# Patient Record
Sex: MALE | Race: BLACK OR AFRICAN AMERICAN | NOT HISPANIC OR LATINO | Employment: FULL TIME | URBAN - METROPOLITAN AREA
[De-identification: names, ages, dates, MRNs, and addresses within clinical notes are randomized per-mention and may not be internally consistent; named-entity substitution may affect disease eponyms.]

---

## 2022-12-09 RX ORDER — HYDROMORPHONE HYDROCHLORIDE 8 MG/1
8 TABLET ORAL EVERY 4 HOURS PRN
Status: ON HOLD | COMMUNITY
End: 2023-01-04 | Stop reason: HOSPADM

## 2022-12-09 RX ORDER — TIZANIDINE 4 MG/1
4 TABLET ORAL EVERY 8 HOURS PRN
COMMUNITY

## 2022-12-09 RX ORDER — AMITRIPTYLINE HYDROCHLORIDE 75 MG/1
75 TABLET ORAL NIGHTLY
COMMUNITY

## 2022-12-09 RX ORDER — GABAPENTIN 600 MG/1
600 TABLET ORAL 3 TIMES DAILY
COMMUNITY

## 2022-12-09 RX ORDER — POLYETHYLENE GLYCOL 3350 17 G/17G
17 POWDER, FOR SOLUTION ORAL DAILY PRN
COMMUNITY

## 2022-12-12 ENCOUNTER — HOSPITAL ENCOUNTER (OUTPATIENT)
Dept: RADIOLOGY | Facility: HOSPITAL | Age: 52
Discharge: HOME | End: 2022-12-12
Attending: COLON & RECTAL SURGERY
Payer: COMMERCIAL

## 2022-12-12 DIAGNOSIS — Z00.00 ROUTINE CHECK-UP: ICD-10-CM

## 2022-12-12 PROCEDURE — 93010 EKG 12-LEAD: ICD-10-PCS | Mod: 76,,, | Performed by: STUDENT IN AN ORGANIZED HEALTH CARE EDUCATION/TRAINING PROGRAM

## 2022-12-12 PROCEDURE — 93005 ELECTROCARDIOGRAM TRACING: CPT

## 2022-12-12 PROCEDURE — 93010 ELECTROCARDIOGRAM REPORT: CPT | Mod: 76,,, | Performed by: STUDENT IN AN ORGANIZED HEALTH CARE EDUCATION/TRAINING PROGRAM

## 2022-12-12 PROCEDURE — 71046 X-RAY EXAM CHEST 2 VIEWS: CPT | Mod: TC

## 2022-12-12 NOTE — PROGRESS NOTES
Preop CHG instructions: 3 preop showers using provided CHG solution starting Saturday (12/24/22.)Do not use solution on face, use a clean towel for each wash and always start at surgery site (abdomen/belly). No animals in bed. No lotions, creams, powders, etc to skin after last wash.  Wash sheets, undergarments and pajamas in HOT water and normal detergent when surgery washes are started Saturday (12/24/22.) Printed Preop CHG scrub instructions and solution given.      12/12/22 1103   Education   Person Taught patient;spouse   Learning Readiness and Ability no barriers identified   Teaching Focus other (see comments)  (preop bathing instructions)   Education Outcome Evaluation eager to learn;able to teach back;verbalizes understanding

## 2022-12-21 ENCOUNTER — ANESTHESIA EVENT (OUTPATIENT)
Dept: SURGERY | Facility: HOSPITAL | Age: 52
DRG: 330 | End: 2022-12-21
Payer: COMMERCIAL

## 2022-12-23 ENCOUNTER — LAB VISIT (OUTPATIENT)
Dept: LAB | Facility: HOSPITAL | Age: 52
End: 2022-12-23
Attending: NURSE PRACTITIONER
Payer: COMMERCIAL

## 2022-12-23 DIAGNOSIS — Z01.818 PREOP TESTING: ICD-10-CM

## 2022-12-23 PROCEDURE — 93010 ELECTROCARDIOGRAM REPORT: CPT | Mod: ,,, | Performed by: INTERNAL MEDICINE

## 2022-12-23 PROCEDURE — 93010 EKG 12-LEAD: ICD-10-PCS | Mod: ,,, | Performed by: INTERNAL MEDICINE

## 2022-12-23 PROCEDURE — 93005 ELECTROCARDIOGRAM TRACING: CPT

## 2022-12-23 NOTE — PROGRESS NOTES
"Spoke with Kamaljit Hernandez NP, Kamaljit had Dr Zahida Ocasio reviewed chart. Pt denies SOB/chest pain, DASI 11/12. "Ok to proceed"   "

## 2022-12-27 ENCOUNTER — ANESTHESIA (OUTPATIENT)
Dept: SURGERY | Facility: HOSPITAL | Age: 52
DRG: 330 | End: 2022-12-27
Payer: COMMERCIAL

## 2022-12-27 ENCOUNTER — HOSPITAL ENCOUNTER (INPATIENT)
Facility: HOSPITAL | Age: 52
LOS: 8 days | Discharge: HOME HEALTH | DRG: 330 | End: 2023-01-04
Attending: COLON & RECTAL SURGERY | Admitting: COLON & RECTAL SURGERY
Payer: COMMERCIAL

## 2022-12-27 DIAGNOSIS — Z01.818 PREOP TESTING: ICD-10-CM

## 2022-12-27 DIAGNOSIS — C20 RECTAL CANCER: ICD-10-CM

## 2022-12-27 DIAGNOSIS — C20 MALIGNANT NEOPLASM OF RECTUM: Primary | ICD-10-CM

## 2022-12-27 LAB
ABORH RETYPE: NORMAL
GROUP & RH: NORMAL
INDIRECT COOMBS GEL: NORMAL

## 2022-12-27 PROCEDURE — 37000008 HC ANESTHESIA 1ST 15 MINUTES: Performed by: COLON & RECTAL SURGERY

## 2022-12-27 PROCEDURE — 63600175 PHARM REV CODE 636 W HCPCS: Performed by: ANESTHESIOLOGY

## 2022-12-27 PROCEDURE — 25000003 PHARM REV CODE 250: Performed by: COLON & RECTAL SURGERY

## 2022-12-27 PROCEDURE — 27000221 HC OXYGEN, UP TO 24 HOURS

## 2022-12-27 PROCEDURE — 37000009 HC ANESTHESIA EA ADD 15 MINS: Performed by: COLON & RECTAL SURGERY

## 2022-12-27 PROCEDURE — 63600175 PHARM REV CODE 636 W HCPCS

## 2022-12-27 PROCEDURE — 63600175 PHARM REV CODE 636 W HCPCS: Performed by: COLON & RECTAL SURGERY

## 2022-12-27 PROCEDURE — 71000033 HC RECOVERY, INTIAL HOUR: Performed by: COLON & RECTAL SURGERY

## 2022-12-27 PROCEDURE — 86900 BLOOD TYPING SEROLOGIC ABO: CPT | Performed by: COLON & RECTAL SURGERY

## 2022-12-27 PROCEDURE — 25000003 PHARM REV CODE 250: Performed by: ANESTHESIOLOGY

## 2022-12-27 PROCEDURE — 86923 COMPATIBILITY TEST ELECTRIC: CPT | Mod: 91 | Performed by: COLON & RECTAL SURGERY

## 2022-12-27 PROCEDURE — C1765 ADHESION BARRIER: HCPCS | Performed by: COLON & RECTAL SURGERY

## 2022-12-27 PROCEDURE — 25000003 PHARM REV CODE 250

## 2022-12-27 PROCEDURE — 36000709 HC OR TIME LEV III EA ADD 15 MIN: Performed by: COLON & RECTAL SURGERY

## 2022-12-27 PROCEDURE — 27201423 OPTIME MED/SURG SUP & DEVICES STERILE SUPPLY: Performed by: COLON & RECTAL SURGERY

## 2022-12-27 PROCEDURE — C1729 CATH, DRAINAGE: HCPCS | Performed by: COLON & RECTAL SURGERY

## 2022-12-27 PROCEDURE — 11000001 HC ACUTE MED/SURG PRIVATE ROOM

## 2022-12-27 PROCEDURE — 36415 COLL VENOUS BLD VENIPUNCTURE: CPT | Performed by: COLON & RECTAL SURGERY

## 2022-12-27 PROCEDURE — 36000708 HC OR TIME LEV III 1ST 15 MIN: Performed by: COLON & RECTAL SURGERY

## 2022-12-27 DEVICE — BARRIER SEPRAFILM ADHESION: Type: IMPLANTABLE DEVICE | Site: ABDOMEN | Status: FUNCTIONAL

## 2022-12-27 RX ORDER — METHOCARBAMOL 100 MG/ML
1000 INJECTION, SOLUTION INTRAMUSCULAR; INTRAVENOUS ONCE
Status: COMPLETED | OUTPATIENT
Start: 2022-12-27 | End: 2022-12-27

## 2022-12-27 RX ORDER — HYDROMORPHONE HYDROCHLORIDE 2 MG/ML
1 INJECTION, SOLUTION INTRAMUSCULAR; INTRAVENOUS; SUBCUTANEOUS EVERY 4 HOURS PRN
Status: DISCONTINUED | OUTPATIENT
Start: 2022-12-27 | End: 2022-12-30

## 2022-12-27 RX ORDER — HYDROMORPHONE HYDROCHLORIDE 2 MG/ML
0.2 INJECTION, SOLUTION INTRAMUSCULAR; INTRAVENOUS; SUBCUTANEOUS EVERY 5 MIN PRN
Status: DISCONTINUED | OUTPATIENT
Start: 2022-12-27 | End: 2022-12-27

## 2022-12-27 RX ORDER — HYDROCODONE BITARTRATE AND ACETAMINOPHEN 500; 5 MG/1; MG/1
TABLET ORAL
Status: DISCONTINUED | OUTPATIENT
Start: 2022-12-27 | End: 2022-12-27

## 2022-12-27 RX ORDER — LIDOCAINE HYDROCHLORIDE 10 MG/ML
1 INJECTION, SOLUTION EPIDURAL; INFILTRATION; INTRACAUDAL; PERINEURAL ONCE
Status: CANCELLED | OUTPATIENT
Start: 2022-12-27 | End: 2022-12-27

## 2022-12-27 RX ORDER — HYDROMORPHONE HYDROCHLORIDE 2 MG/ML
0.4 INJECTION, SOLUTION INTRAMUSCULAR; INTRAVENOUS; SUBCUTANEOUS EVERY 5 MIN PRN
Status: DISCONTINUED | OUTPATIENT
Start: 2022-12-27 | End: 2022-12-27

## 2022-12-27 RX ORDER — METHOCARBAMOL 100 MG/ML
INJECTION, SOLUTION INTRAMUSCULAR; INTRAVENOUS
Status: COMPLETED
Start: 2022-12-27 | End: 2022-12-27

## 2022-12-27 RX ORDER — MUPIROCIN 20 MG/G
OINTMENT TOPICAL 2 TIMES DAILY
Status: DISPENSED | OUTPATIENT
Start: 2022-12-27 | End: 2023-01-01

## 2022-12-27 RX ORDER — GABAPENTIN 300 MG/1
600 CAPSULE ORAL ONCE
Status: COMPLETED | OUTPATIENT
Start: 2022-12-27 | End: 2022-12-27

## 2022-12-27 RX ORDER — MIDAZOLAM HYDROCHLORIDE 1 MG/ML
2 INJECTION INTRAMUSCULAR; INTRAVENOUS ONCE AS NEEDED
Status: CANCELLED | OUTPATIENT
Start: 2022-12-27 | End: 2034-05-25

## 2022-12-27 RX ORDER — ONDANSETRON 2 MG/ML
4 INJECTION INTRAMUSCULAR; INTRAVENOUS EVERY 8 HOURS PRN
Status: DISCONTINUED | OUTPATIENT
Start: 2022-12-27 | End: 2023-01-04 | Stop reason: HOSPADM

## 2022-12-27 RX ORDER — ROCURONIUM BROMIDE 10 MG/ML
INJECTION, SOLUTION INTRAVENOUS
Status: DISCONTINUED | OUTPATIENT
Start: 2022-12-27 | End: 2022-12-27

## 2022-12-27 RX ORDER — ENOXAPARIN SODIUM 100 MG/ML
30 INJECTION SUBCUTANEOUS ONCE
Status: COMPLETED | OUTPATIENT
Start: 2022-12-27 | End: 2022-12-27

## 2022-12-27 RX ORDER — PROCHLORPERAZINE EDISYLATE 5 MG/ML
5 INJECTION INTRAMUSCULAR; INTRAVENOUS EVERY 30 MIN PRN
Status: DISCONTINUED | OUTPATIENT
Start: 2022-12-27 | End: 2022-12-27

## 2022-12-27 RX ORDER — ACETAMINOPHEN 10 MG/ML
1000 INJECTION, SOLUTION INTRAVENOUS ONCE
Status: COMPLETED | OUTPATIENT
Start: 2022-12-27 | End: 2022-12-27

## 2022-12-27 RX ORDER — ONDANSETRON 4 MG/1
8 TABLET, ORALLY DISINTEGRATING ORAL EVERY 6 HOURS PRN
Status: CANCELLED | OUTPATIENT
Start: 2022-12-27

## 2022-12-27 RX ORDER — GABAPENTIN 300 MG/1
600 CAPSULE ORAL 3 TIMES DAILY
Status: DISCONTINUED | OUTPATIENT
Start: 2022-12-27 | End: 2022-12-31

## 2022-12-27 RX ORDER — KETOROLAC TROMETHAMINE 30 MG/ML
15 INJECTION, SOLUTION INTRAMUSCULAR; INTRAVENOUS EVERY 6 HOURS
Status: DISCONTINUED | OUTPATIENT
Start: 2022-12-27 | End: 2022-12-28

## 2022-12-27 RX ORDER — IPRATROPIUM BROMIDE AND ALBUTEROL SULFATE 2.5; .5 MG/3ML; MG/3ML
3 SOLUTION RESPIRATORY (INHALATION)
Status: DISCONTINUED | OUTPATIENT
Start: 2022-12-27 | End: 2022-12-27

## 2022-12-27 RX ORDER — PROPOFOL 10 MG/ML
VIAL (ML) INTRAVENOUS
Status: DISCONTINUED | OUTPATIENT
Start: 2022-12-27 | End: 2022-12-27

## 2022-12-27 RX ORDER — CELECOXIB 200 MG/1
200 CAPSULE ORAL ONCE
Status: COMPLETED | OUTPATIENT
Start: 2022-12-27 | End: 2022-12-27

## 2022-12-27 RX ORDER — PHENYLEPHRINE HCL IN 0.9% NACL 1 MG/10 ML
SYRINGE (ML) INTRAVENOUS
Status: DISCONTINUED | OUTPATIENT
Start: 2022-12-27 | End: 2022-12-27

## 2022-12-27 RX ORDER — SODIUM CHLORIDE, SODIUM LACTATE, POTASSIUM CHLORIDE, CALCIUM CHLORIDE 600; 310; 30; 20 MG/100ML; MG/100ML; MG/100ML; MG/100ML
INJECTION, SOLUTION INTRAVENOUS CONTINUOUS
Status: DISCONTINUED | OUTPATIENT
Start: 2022-12-27 | End: 2022-12-31

## 2022-12-27 RX ORDER — MEPERIDINE HYDROCHLORIDE 25 MG/ML
12.5 INJECTION INTRAMUSCULAR; INTRAVENOUS; SUBCUTANEOUS EVERY 10 MIN PRN
Status: DISCONTINUED | OUTPATIENT
Start: 2022-12-27 | End: 2022-12-27

## 2022-12-27 RX ORDER — PROCHLORPERAZINE EDISYLATE 5 MG/ML
5 INJECTION INTRAMUSCULAR; INTRAVENOUS EVERY 6 HOURS PRN
Status: DISCONTINUED | OUTPATIENT
Start: 2022-12-27 | End: 2023-01-04 | Stop reason: HOSPADM

## 2022-12-27 RX ORDER — LIDOCAINE HYDROCHLORIDE 20 MG/ML
INJECTION, SOLUTION EPIDURAL; INFILTRATION; INTRACAUDAL; PERINEURAL
Status: DISCONTINUED | OUTPATIENT
Start: 2022-12-27 | End: 2022-12-27

## 2022-12-27 RX ORDER — FENTANYL CITRATE 50 UG/ML
INJECTION, SOLUTION INTRAMUSCULAR; INTRAVENOUS
Status: DISCONTINUED | OUTPATIENT
Start: 2022-12-27 | End: 2022-12-27

## 2022-12-27 RX ORDER — ONDANSETRON HYDROCHLORIDE 2 MG/ML
INJECTION, SOLUTION INTRAMUSCULAR; INTRAVENOUS
Status: DISCONTINUED | OUTPATIENT
Start: 2022-12-27 | End: 2022-12-27

## 2022-12-27 RX ORDER — DEXAMETHASONE SODIUM PHOSPHATE 4 MG/ML
INJECTION, SOLUTION INTRA-ARTICULAR; INTRALESIONAL; INTRAMUSCULAR; INTRAVENOUS; SOFT TISSUE
Status: DISCONTINUED | OUTPATIENT
Start: 2022-12-27 | End: 2022-12-27

## 2022-12-27 RX ORDER — ONDANSETRON 2 MG/ML
4 INJECTION INTRAMUSCULAR; INTRAVENOUS DAILY PRN
Status: DISCONTINUED | OUTPATIENT
Start: 2022-12-27 | End: 2022-12-27

## 2022-12-27 RX ORDER — ACETAMINOPHEN 650 MG/20.3ML
650 LIQUID ORAL EVERY 4 HOURS
Status: DISPENSED | OUTPATIENT
Start: 2022-12-27 | End: 2022-12-30

## 2022-12-27 RX ORDER — SODIUM CHLORIDE, SODIUM GLUCONATE, SODIUM ACETATE, POTASSIUM CHLORIDE AND MAGNESIUM CHLORIDE 30; 37; 368; 526; 502 MG/100ML; MG/100ML; MG/100ML; MG/100ML; MG/100ML
INJECTION, SOLUTION INTRAVENOUS CONTINUOUS
Status: CANCELLED | OUTPATIENT
Start: 2022-12-27 | End: 2023-01-26

## 2022-12-27 RX ORDER — BUPIVACAINE HYDROCHLORIDE 5 MG/ML
INJECTION, SOLUTION EPIDURAL; INTRACAUDAL
Status: DISCONTINUED | OUTPATIENT
Start: 2022-12-27 | End: 2022-12-27 | Stop reason: HOSPADM

## 2022-12-27 RX ORDER — MIDAZOLAM HYDROCHLORIDE 1 MG/ML
INJECTION INTRAMUSCULAR; INTRAVENOUS
Status: DISCONTINUED | OUTPATIENT
Start: 2022-12-27 | End: 2022-12-27

## 2022-12-27 RX ORDER — TIZANIDINE 4 MG/1
4 TABLET ORAL EVERY 8 HOURS PRN
Status: DISCONTINUED | OUTPATIENT
Start: 2022-12-27 | End: 2023-01-04 | Stop reason: HOSPADM

## 2022-12-27 RX ORDER — HYDROMORPHONE HYDROCHLORIDE 2 MG/ML
INJECTION, SOLUTION INTRAMUSCULAR; INTRAVENOUS; SUBCUTANEOUS
Status: DISCONTINUED | OUTPATIENT
Start: 2022-12-27 | End: 2022-12-27

## 2022-12-27 RX ADMIN — HYDROMORPHONE HYDROCHLORIDE 0.5 MG: 2 INJECTION, SOLUTION INTRAMUSCULAR; INTRAVENOUS; SUBCUTANEOUS at 12:12

## 2022-12-27 RX ADMIN — Medication 100 MCG: at 11:12

## 2022-12-27 RX ADMIN — ROCURONIUM BROMIDE 20 MG: 50 INJECTION INTRAVENOUS at 10:12

## 2022-12-27 RX ADMIN — ROCURONIUM BROMIDE 20 MG: 50 INJECTION INTRAVENOUS at 11:12

## 2022-12-27 RX ADMIN — MUPIROCIN: 20 OINTMENT TOPICAL at 11:12

## 2022-12-27 RX ADMIN — GABAPENTIN 600 MG: 300 CAPSULE ORAL at 08:12

## 2022-12-27 RX ADMIN — ERTAPENEM 1 G: 1 INJECTION INTRAMUSCULAR; INTRAVENOUS at 08:12

## 2022-12-27 RX ADMIN — AMITRIPTYLINE HYDROCHLORIDE 75 MG: 25 TABLET, FILM COATED ORAL at 08:12

## 2022-12-27 RX ADMIN — SUGAMMADEX 200 MG: 100 INJECTION, SOLUTION INTRAVENOUS at 12:12

## 2022-12-27 RX ADMIN — LIDOCAINE HYDROCHLORIDE 80 MG: 20 INJECTION, SOLUTION EPIDURAL; INFILTRATION; INTRACAUDAL; PERINEURAL at 08:12

## 2022-12-27 RX ADMIN — ENOXAPARIN SODIUM 30 MG: 30 INJECTION SUBCUTANEOUS at 08:12

## 2022-12-27 RX ADMIN — HYDROMORPHONE HYDROCHLORIDE 0.5 MG: 2 INJECTION, SOLUTION INTRAMUSCULAR; INTRAVENOUS; SUBCUTANEOUS at 10:12

## 2022-12-27 RX ADMIN — ROCURONIUM BROMIDE 20 MG: 50 INJECTION INTRAVENOUS at 09:12

## 2022-12-27 RX ADMIN — DEXAMETHASONE SODIUM PHOSPHATE 4 MG: 4 INJECTION, SOLUTION INTRA-ARTICULAR; INTRALESIONAL; INTRAMUSCULAR; INTRAVENOUS; SOFT TISSUE at 08:12

## 2022-12-27 RX ADMIN — HYDROMORPHONE HYDROCHLORIDE 1 MG: 2 INJECTION INTRAMUSCULAR; INTRAVENOUS; SUBCUTANEOUS at 02:12

## 2022-12-27 RX ADMIN — SODIUM CHLORIDE, POTASSIUM CHLORIDE, SODIUM LACTATE AND CALCIUM CHLORIDE: 600; 310; 30; 20 INJECTION, SOLUTION INTRAVENOUS at 03:12

## 2022-12-27 RX ADMIN — HYDROMORPHONE HYDROCHLORIDE 0.2 MG: 2 INJECTION, SOLUTION INTRAMUSCULAR; INTRAVENOUS; SUBCUTANEOUS at 09:12

## 2022-12-27 RX ADMIN — METHOCARBAMOL 1000 MG: 100 INJECTION, SOLUTION INTRAMUSCULAR; INTRAVENOUS at 01:12

## 2022-12-27 RX ADMIN — ACETAMINOPHEN 1000 MG: 10 INJECTION, SOLUTION INTRAVENOUS at 08:12

## 2022-12-27 RX ADMIN — PROPOFOL 150 MG: 10 INJECTION, EMULSION INTRAVENOUS at 08:12

## 2022-12-27 RX ADMIN — FENTANYL CITRATE 100 MCG: 50 INJECTION, SOLUTION INTRAMUSCULAR; INTRAVENOUS at 08:12

## 2022-12-27 RX ADMIN — ONDANSETRON 4 MG: 2 INJECTION INTRAMUSCULAR; INTRAVENOUS at 12:12

## 2022-12-27 RX ADMIN — MIDAZOLAM HYDROCHLORIDE 2 MG: 1 INJECTION, SOLUTION INTRAMUSCULAR; INTRAVENOUS at 08:12

## 2022-12-27 RX ADMIN — KETOROLAC TROMETHAMINE 15 MG: 30 INJECTION, SOLUTION INTRAMUSCULAR; INTRAVENOUS at 05:12

## 2022-12-27 RX ADMIN — KETOROLAC TROMETHAMINE 15 MG: 30 INJECTION, SOLUTION INTRAMUSCULAR; INTRAVENOUS at 11:12

## 2022-12-27 RX ADMIN — HYDROMORPHONE HYDROCHLORIDE 1 MG: 2 INJECTION INTRAMUSCULAR; INTRAVENOUS; SUBCUTANEOUS at 08:12

## 2022-12-27 RX ADMIN — CELECOXIB 200 MG: 200 CAPSULE ORAL at 08:12

## 2022-12-27 RX ADMIN — ROCURONIUM BROMIDE 50 MG: 50 INJECTION INTRAVENOUS at 08:12

## 2022-12-27 RX ADMIN — Medication 100 MCG: at 12:12

## 2022-12-27 RX ADMIN — HYDROMORPHONE HYDROCHLORIDE 0.3 MG: 2 INJECTION, SOLUTION INTRAMUSCULAR; INTRAVENOUS; SUBCUTANEOUS at 09:12

## 2022-12-27 RX ADMIN — SODIUM CHLORIDE, SODIUM GLUCONATE, SODIUM ACETATE, POTASSIUM CHLORIDE AND MAGNESIUM CHLORIDE: 526; 502; 368; 37; 30 INJECTION, SOLUTION INTRAVENOUS at 08:12

## 2022-12-27 RX ADMIN — ACETAMINOPHEN 650 MG: 650 SOLUTION ORAL at 05:12

## 2022-12-27 NOTE — TRANSFER OF CARE
"Anesthesia Transfer of Care Note    Patient: Demetrio Wilson Jr.    Procedure(s) Performed: Procedure(s) (LRB):  PROCTECTOMY, ABDOMINOPERINEAL (N/A)    Patient location: PACU    Anesthesia Type: general    Transport from OR: Transported from OR on room air with adequate spontaneous ventilation    Post pain: adequate analgesia    Post assessment: no apparent anesthetic complications    Post vital signs: stable    Level of consciousness: sedated    Nausea/Vomiting: no nausea/vomiting    Complications: none    Transfer of care protocol was followed      Last vitals:   Visit Vitals  /87   Pulse 92   Temp 37.6 °C (99.6 °F) (Oral)   Resp 18   Ht 5' 8" (1.727 m)   Wt 87.1 kg (192 lb 0.3 oz)   SpO2 96%   BMI 29.20 kg/m²     "

## 2022-12-27 NOTE — ANESTHESIA POSTPROCEDURE EVALUATION
Anesthesia Post Evaluation    Patient: Demetrio Wilson     Procedure(s) Performed: Procedure(s) (LRB):  PROCTECTOMY, ABDOMINOPERINEAL (N/A)    Final Anesthesia Type: general      Patient location during evaluation: PACU  Patient participation: Yes- Able to Participate  Level of consciousness: awake  Post-procedure vital signs: reviewed and stable  Pain management: adequate  Airway patency: patent    PONV status at discharge: vomiting (controlled) and nausea (controlled)  Anesthetic complications: no      Cardiovascular status: hemodynamically stable  Respiratory status: spontaneous ventilation and unassisted  Hydration status: euvolemic  Follow-up not needed.  Comments:              Vitals Value Taken Time   /89 12/27/22 1417   Temp ** 12/27/22 1441   Pulse 98 12/27/22 1417   Resp 15 12/27/22 1410   SpO2 98 % 12/27/22 1417         Event Time   Out of Recovery 14:13:00         Pain/Nicolasa Score: Pain Rating Prior to Med Admin: 0 (12/27/2022  8:11 AM)  Nicolasa Score: 8 (12/27/2022  2:13 PM)

## 2022-12-27 NOTE — ANESTHESIA PROCEDURE NOTES
Intubation    Date/Time: 12/27/2022 8:42 AM  Performed by: Jim Bautista  Authorized by: Noe Ramirez Jr., MD     Intubation:     Induction:  Intravenous    Intubated:  Postinduction    Mask Ventilation:  Easy with oral airway    Attempts:  1    Attempted By:  CRNA    Method of Intubation:  Direct    Blade:  Villasenor 2    Laryngeal View Grade: Grade IIA - cords partially seen      Difficult Airway Encountered?: No      Complications:  None    Airway Device:  Oral endotracheal tube    Airway Device Size:  7.5    Style/Cuff Inflation:  Cuffed (inflated to minimal occlusive pressure)    Inflation Amount (mL):  6    Tube secured:  21    Secured at:  The lips    Placement Verified By:  Capnometry    Complicating Factors:  None    Findings Post-Intubation:  BS equal bilateral

## 2022-12-27 NOTE — INTERVAL H&P NOTE
The patient has been examined and the H&P has been reviewed:    I concur with the findings and no changes have occurred since H&P was written.    Surgery risks, benefits and alternative options discussed and understood by patient/family.          Active Hospital Problems    Diagnosis  POA    *Malignant neoplasm of rectum [C20]  Yes      Resolved Hospital Problems   No resolved problems to display.

## 2022-12-27 NOTE — OP NOTE
[facility] - Periop Services  Operative Note      Date of Procedure: 12/27/2022     Procedure: Abdominoperineal resection    Surgeon(s) and Role:     * Bob Rahman MD - Primary    Assisting Surgeon: None    Pre-Operative Diagnosis: Rectal cancer [C20]    Post-Operative Diagnosis: Same    Anesthesia: General    Estimated Blood Loss (EBL): 300 mL           Specimens:   Sigmoid, rectum and anus   Rectal cancer site    Description of Technical Procedures:  After informed consent was obtained, patient was brought to the operating room and placed in the supine position.  Next general endotracheal anesthesia was administered by member of the anesthesia team.  Patient was placed in lithotomy position using Nitin stirrups.  The abdomen and perineum were prepped and draped in sterile surgical fashion.  A low midline incision was made with a 10 blade.  Incision was deepened electrocautery.  The fascia was divided vertically in the midline and the peritoneum was incised sharply.  A large Wilbert wound edge protector was utilized.  Bookwalter was used for exposure.  Sigmoid colon was mobilized by incising the lateral peritoneal attachment with electrocautery working down into the pelvis.  Left ureter was identified and kept out of harm's way.  The right side of the sigmoid was mobilized by incising the peritoneal attachment down into the pelvis again.  The DANDRE vascular pedicle was skeletonized and isolated.  It was sharply divided with Metzenbaum scissors between Serena clamps and 2-0 Vicryl stick ties.  The colon was transected with a TLC 75 mm stapler with a blue cartridge.  Mesentery was divided with the EnSeal X1 device.  The bowel was then packed into the upper abdomen.  The presacral space was entered and this plane was developed deep into the pelvis.  Lateral stalks were divided with electrocautery.  Finally the anterior peritoneal reflection was incised and this plane was developed distally.  Patient  had a very narrow and deep pelvis.  Exposure and visualization became somewhat difficult once we got behind the prostate.  Attention was then turned to the perineum where a shield type incision was made with the electrocautery encompassing the anus and sphincter.  The incision was deepened into the ischiorectal spaces bilaterally.  The dissection was then carried out posteriorly and the pelvis was entered just anterior to the coccyx.  The levators were divided bilaterally then finally the anterior dissection was performed.  The rectal cancer was in the right anterior lateral area and the ulcer was necrotic as it  during retraction.  The previous tumor site appeared heavily scarred to the prostate from the prior radiation.  This plane was then developed and the tumor site was removed.  It was sent as a separate specimen from the sigmoid, rectum, and anus.  The pelvis was then irrigated with saline and hemostasis was achieved with spot cautery.  The perineal incision was closed in a layered fashion using interrupted figure-of-eight 0 Vicryl suture to reapproximate the levators, interrupted 2-0 Vicryl suture to reapproximate the subcutaneous tissue, and interrupted vertical mattress 2-0 Vicryl suture to reapproximate the skin edges.  I then changed gown and gloves and returned to the operating room table.  Pelvis was irrigated and suctioned.  Hemostasis was ensured.  Two 19 Polish Michael drains were placed in the pelvis and brought out through separate stab incisions in the right lower quadrant abdominal wall.  They were secured to the skin with 2-0 silk suture.  A circular skin incision was then made over the left lower quadrant abdominal wall the previously marked spot by the enterostomal therapist.  Incision was deepened electrocautery and a vertical incision was made in the anterior rectus sheath.  Muscle-splitting technique was employed and the posterior fascia and peritoneum were incised to permit 2  fingers.  Colon easily delivered through this wound.  Proper orientation of the colon and mesentery was confirmed.  Seprafilm was laid throughout the abdominal cavity.  The peritoneum was reapproximated with running 0 Vicryl suture.  The midline fascial defect was repaired with running looped PDS suture.  Subcutaneous tissue was irrigated and hemostasis achieved with spot cautery.  Skin edges were reapproximated with skin staples.  Incision was isolated with a blue towel.  The staple line was incised off of the colon and an end colostomy was matured using interrupted full-thickness 2-0 Vicryl suture.  The mucosa was pink and the stoma was widely patent.  A colostomy appliance was placed around the stoma.  Drains were cut to the appropriate length and attached to bulb suction.  A sterile dressing was applied to the midline incision.  Patient tolerated the procedure well and there were no complications.  He was returned to the supine position.  He was awakened and extubated in the operating room then subsequently transferred to recovery in satisfactory condition.

## 2022-12-27 NOTE — ANESTHESIA PREPROCEDURE EVALUATION
12/26/2022  Demetrio Wilson Jr. is a 52 y.o., male with rectal cancer admitted through outpatient surgery for abdominal peritoneal resection of rectal tumor.    Last 3 sets of Vitals    Vitals - 1 value per visit 12/9/2022   Weight (lb) 195   Weight (kg) 88.451   Height 68   BMI (Calculated) 29.7         Lab Results   Component Value Date    WBC 2.7 (L) 12/12/2022    HGB 12.0 (L) 12/12/2022    HCT 38.9 (L) 12/12/2022    MCV 90.0 12/12/2022     12/12/2022          BMP  Lab Results   Component Value Date     12/12/2022    K 3.9 12/12/2022    CO2 28 12/12/2022    BUN 5.7 (L) 12/12/2022    CREATININE 0.81 12/12/2022    CALCIUM 9.6 12/12/2022        CMP  Sodium Level   Date Value Ref Range Status   12/12/2022 140 136 - 145 mmol/L Final     Potassium Level   Date Value Ref Range Status   12/12/2022 3.9 3.5 - 5.1 mmol/L Final     Carbon Dioxide   Date Value Ref Range Status   12/12/2022 28 22 - 29 mmol/L Final     Blood Urea Nitrogen   Date Value Ref Range Status   12/12/2022 5.7 (L) 8.4 - 25.7 mg/dL Final     Creatinine   Date Value Ref Range Status   12/12/2022 0.81 0.73 - 1.18 mg/dL Final     Calcium Level Total   Date Value Ref Range Status   12/12/2022 9.6 8.4 - 10.2 mg/dL Final     Albumin Level   Date Value Ref Range Status   12/12/2022 3.5 3.5 - 5.0 gm/dL Final     Bilirubin Total   Date Value Ref Range Status   12/12/2022 0.4 <=1.5 mg/dL Final     Alkaline Phosphatase   Date Value Ref Range Status   12/12/2022 56 40 - 150 unit/L Final     Aspartate Aminotransferase   Date Value Ref Range Status   12/12/2022 17 5 - 34 unit/L Final     Alanine Aminotransferase   Date Value Ref Range Status   12/12/2022 12 0 - 55 unit/L Final        Pre-op Assessment    I have reviewed the Patient Summary Reports.    I have reviewed the NPO Status.   I have reviewed the Medications.     Review of  Systems  Anesthesia Hx:   Denies Personal Hx of Anesthesia complications.   Social:  Non-Smoker    Cardiovascular:  Functional Capacity good / => 4 METS    Hepatic/GI:   GERD        Physical Exam  General: Well nourished, Cooperative, Alert and Oriented    Airway:  Mouth Opening: Normal  TM Distance: Normal  Tongue: Normal  Neck ROM: Normal ROM    Dental:  Intact    Chest/Lungs:  Clear to auscultation, Normal Respiratory Rate    Heart:  Rate: Normal  Rhythm: Regular Rhythm        Anesthesia Plan  Type of Anesthesia, risks & benefits discussed:    Anesthesia Type: Gen ETT  Intra-op Monitoring Plan: Standard ASA Monitors  Post Op Pain Control Plan: multimodal analgesia and IV/PO Opioids PRN  Induction:  IV  Airway Plan: Direct  Informed Consent: Informed consent signed with the Patient and all parties understand the risks and agree with anesthesia plan.  All questions answered.   ASA Score: 2  Day of Surgery Review of History & Physical: H&P Update referred to the surgeon/provider.    Ready For Surgery From Anesthesia Perspective.     .

## 2022-12-27 NOTE — NURSING
Subjective:       Patient ID: Demetrio Wilson Jr. is a 52 y.o. male.    Chief Complaint: No chief complaint on file.    HPI  Review of Systems    Objective:      Physical Exam    Assessment:       1. Preop testing    2. Rectal cancer           Incision/Site 12/27/22 1155 Abdomen (Active)   12/27/22 1155   Present Prior to Hospital Arrival?:    Side:    Location: Abdomen   Orientation:    Incision Type:    Closure Method:    Additional Comments:    Removal Indication and Assessment:    Wound Outcome:    Removal Indications:    Incision WDL WDL 12/27/22 1310   Dressing Appearance Intact;Dry;Clean 12/27/22 1310   Drainage Amount None 12/27/22 1310   Appearance Dressing in place, unable to visualize 12/27/22 1310   Wound Edges Approximated 12/27/22 1310   Dressing Island/border 12/27/22 1310            Incision/Site 12/27/22 1155 Perineum (Active)   12/27/22 1155   Present Prior to Hospital Arrival?:    Side:    Location: Perineum   Orientation:    Incision Type:    Closure Method:    Additional Comments:    Removal Indication and Assessment:    Wound Outcome:    Removal Indications:    Dressing Appearance Dry;Intact 12/27/22 1310   Dressing Gauze;Other (comment) 12/27/22 1310           Plan:              51 y/o male rectal cancer for sx today per Dr. Rahman.  Orders to [facility] for possible colostomy or ileostomy.   Pt came with his wife.  He is awake alert oriented and mobil.    I educated him on why I was here to [facility]-to find a suitable exit site for the procedure if need .    With him both lying down and standing 2 areas were marked and covered with tegaderm.    Will check on him tomorrow.

## 2022-12-28 LAB
ANION GAP SERPL CALC-SCNC: 8 MEQ/L
BASOPHILS # BLD AUTO: 0.01 X10(3)/MCL (ref 0–0.2)
BASOPHILS NFR BLD AUTO: 0.1 %
BUN SERPL-MCNC: 9.6 MG/DL (ref 8.4–25.7)
CALCIUM SERPL-MCNC: 9.1 MG/DL (ref 8.4–10.2)
CHLORIDE SERPL-SCNC: 104 MMOL/L (ref 98–107)
CO2 SERPL-SCNC: 24 MMOL/L (ref 22–29)
CREAT SERPL-MCNC: 0.79 MG/DL (ref 0.73–1.18)
CREAT/UREA NIT SERPL: 12
EOSINOPHIL # BLD AUTO: 0 X10(3)/MCL (ref 0–0.9)
EOSINOPHIL NFR BLD AUTO: 0 %
ERYTHROCYTE [DISTWIDTH] IN BLOOD BY AUTOMATED COUNT: 14.3 % (ref 11.6–14.4)
GFR SERPLBLD CREATININE-BSD FMLA CKD-EPI: >60 MLS/MIN/1.73/M2
GLUCOSE SERPL-MCNC: 122 MG/DL (ref 74–100)
HCT VFR BLD AUTO: 30.2 % (ref 42–52)
HGB BLD-MCNC: 10 GM/DL (ref 14–18)
IMM GRANULOCYTES # BLD AUTO: 0.04 X10(3)/MCL (ref 0–0.04)
IMM GRANULOCYTES NFR BLD AUTO: 0.5 %
LYMPHOCYTES # BLD AUTO: 0.59 X10(3)/MCL (ref 0.6–4.6)
LYMPHOCYTES NFR BLD AUTO: 7.6 %
MCH RBC QN AUTO: 27.9 PG
MCHC RBC AUTO-ENTMCNC: 33.1 MG/DL (ref 33–36)
MCV RBC AUTO: 84.1 FL (ref 80–94)
MONOCYTES # BLD AUTO: 0.79 X10(3)/MCL (ref 0.1–1.3)
MONOCYTES NFR BLD AUTO: 10.2 %
NEUTROPHILS # BLD AUTO: 6.33 X10(3)/MCL (ref 2.1–9.2)
NEUTROPHILS NFR BLD AUTO: 81.6 %
NRBC BLD AUTO-RTO: 0 % (ref 0–1)
PLATELET # BLD AUTO: 247 X10(3)/MCL (ref 140–371)
PMV BLD AUTO: 8.8 FL (ref 9.4–12.4)
POTASSIUM SERPL-SCNC: 4.2 MMOL/L (ref 3.5–5.1)
RBC # BLD AUTO: 3.59 X10(6)/MCL (ref 4.7–6.1)
SODIUM SERPL-SCNC: 136 MMOL/L (ref 136–145)
WBC # SPEC AUTO: 7.8 X10(3)/MCL (ref 4.5–11.5)

## 2022-12-28 PROCEDURE — 85025 COMPLETE CBC W/AUTO DIFF WBC: CPT | Performed by: COLON & RECTAL SURGERY

## 2022-12-28 PROCEDURE — 25000003 PHARM REV CODE 250: Performed by: COLON & RECTAL SURGERY

## 2022-12-28 PROCEDURE — 99900035 HC TECH TIME PER 15 MIN (STAT)

## 2022-12-28 PROCEDURE — 63600175 PHARM REV CODE 636 W HCPCS: Performed by: COLON & RECTAL SURGERY

## 2022-12-28 PROCEDURE — 80048 BASIC METABOLIC PNL TOTAL CA: CPT | Performed by: COLON & RECTAL SURGERY

## 2022-12-28 PROCEDURE — 11000001 HC ACUTE MED/SURG PRIVATE ROOM

## 2022-12-28 PROCEDURE — 36415 COLL VENOUS BLD VENIPUNCTURE: CPT | Performed by: COLON & RECTAL SURGERY

## 2022-12-28 RX ORDER — POLYETHYLENE GLYCOL 3350 17 G/17G
17 POWDER, FOR SOLUTION ORAL DAILY
Status: DISCONTINUED | OUTPATIENT
Start: 2022-12-28 | End: 2023-01-04 | Stop reason: HOSPADM

## 2022-12-28 RX ORDER — KETOROLAC TROMETHAMINE 30 MG/ML
30 INJECTION, SOLUTION INTRAMUSCULAR; INTRAVENOUS EVERY 6 HOURS
Status: COMPLETED | OUTPATIENT
Start: 2022-12-28 | End: 2022-12-30

## 2022-12-28 RX ORDER — ENOXAPARIN SODIUM 100 MG/ML
40 INJECTION SUBCUTANEOUS EVERY 24 HOURS
Status: DISCONTINUED | OUTPATIENT
Start: 2022-12-28 | End: 2023-01-04 | Stop reason: HOSPADM

## 2022-12-28 RX ADMIN — AMITRIPTYLINE HYDROCHLORIDE 75 MG: 25 TABLET, FILM COATED ORAL at 09:12

## 2022-12-28 RX ADMIN — GABAPENTIN 600 MG: 300 CAPSULE ORAL at 09:12

## 2022-12-28 RX ADMIN — ACETAMINOPHEN 650 MG: 650 SOLUTION ORAL at 09:12

## 2022-12-28 RX ADMIN — SODIUM CHLORIDE, POTASSIUM CHLORIDE, SODIUM LACTATE AND CALCIUM CHLORIDE: 600; 310; 30; 20 INJECTION, SOLUTION INTRAVENOUS at 05:12

## 2022-12-28 RX ADMIN — HYDROMORPHONE HYDROCHLORIDE 1 MG: 2 INJECTION INTRAMUSCULAR; INTRAVENOUS; SUBCUTANEOUS at 05:12

## 2022-12-28 RX ADMIN — POLYETHYLENE GLYCOL 3350 17 G: 17 POWDER, FOR SOLUTION ORAL at 09:12

## 2022-12-28 RX ADMIN — ONDANSETRON 4 MG: 2 INJECTION INTRAMUSCULAR; INTRAVENOUS at 01:12

## 2022-12-28 RX ADMIN — SODIUM CHLORIDE, POTASSIUM CHLORIDE, SODIUM LACTATE AND CALCIUM CHLORIDE: 600; 310; 30; 20 INJECTION, SOLUTION INTRAVENOUS at 09:12

## 2022-12-28 RX ADMIN — MUPIROCIN: 20 OINTMENT TOPICAL at 09:12

## 2022-12-28 RX ADMIN — KETOROLAC TROMETHAMINE 15 MG: 30 INJECTION, SOLUTION INTRAMUSCULAR; INTRAVENOUS at 05:12

## 2022-12-28 RX ADMIN — KETOROLAC TROMETHAMINE 30 MG: 30 INJECTION, SOLUTION INTRAMUSCULAR; INTRAVENOUS at 05:12

## 2022-12-28 RX ADMIN — KETOROLAC TROMETHAMINE 30 MG: 30 INJECTION, SOLUTION INTRAMUSCULAR; INTRAVENOUS at 11:12

## 2022-12-28 RX ADMIN — GABAPENTIN 600 MG: 300 CAPSULE ORAL at 02:12

## 2022-12-28 RX ADMIN — SODIUM CHLORIDE, POTASSIUM CHLORIDE, SODIUM LACTATE AND CALCIUM CHLORIDE: 600; 310; 30; 20 INJECTION, SOLUTION INTRAVENOUS at 03:12

## 2022-12-28 RX ADMIN — ENOXAPARIN SODIUM 40 MG: 40 INJECTION SUBCUTANEOUS at 05:12

## 2022-12-28 RX ADMIN — HYDROMORPHONE HYDROCHLORIDE 1 MG: 2 INJECTION INTRAMUSCULAR; INTRAVENOUS; SUBCUTANEOUS at 02:12

## 2022-12-28 RX ADMIN — TIZANIDINE 4 MG: 4 TABLET ORAL at 09:12

## 2022-12-28 RX ADMIN — HYDROMORPHONE HYDROCHLORIDE 1 MG: 2 INJECTION INTRAMUSCULAR; INTRAVENOUS; SUBCUTANEOUS at 09:12

## 2022-12-28 RX ADMIN — HYDROMORPHONE HYDROCHLORIDE 1 MG: 2 INJECTION INTRAMUSCULAR; INTRAVENOUS; SUBCUTANEOUS at 01:12

## 2022-12-28 NOTE — PROGRESS NOTES
CRS    POD 1 s/p APR    Having some pain control issues  Denies N/V    Afebrile   VSS    Excellent UOP    Abdomen soft, appropriate  Colostomy pink  Drains working    WBC 7.8    Hgb 10.0    Creat 0.7      Plan - Hurst d/t deep pelvic dissection          - continue clear liquids          - continue drains          - consult ET for education          - OOB/ambulate          - Lovenox

## 2022-12-28 NOTE — PLAN OF CARE
Problem: Adult Inpatient Plan of Care  Goal: Plan of Care Review  12/28/2022 0341 by Carla Rico RN  Outcome: Ongoing, Progressing  12/28/2022 0341 by Carla Rico RN  Outcome: Ongoing, Progressing  Goal: Patient-Specific Goal (Individualized)  Outcome: Ongoing, Progressing  Goal: Absence of Hospital-Acquired Illness or Injury  Outcome: Ongoing, Progressing  Goal: Optimal Comfort and Wellbeing  Outcome: Ongoing, Progressing  Goal: Readiness for Transition of Care  Outcome: Ongoing, Progressing     Problem: Infection  Goal: Absence of Infection Signs and Symptoms  Outcome: Ongoing, Progressing     Problem: Pain Acute  Goal: Acceptable Pain Control and Functional Ability  Outcome: Ongoing, Progressing      14.4   7.32  )-----------( 172      ( 30 Aug 2018 15:30 )             43.6     08-30    141  |  103  |  20  ----------------------------<  118<H>  4.1   |  25  |  1.2    Ca    9.2      30 Aug 2018 15:30    TPro  6.5  /  Alb  4.3  /  TBili  0.6  /  DBili  x   /  AST  18  /  ALT  37  /  AlkPhos  56  08-30      Serum Pro-Brain Natriuretic Peptide: 699 pg/mL (08.30.18 @ 15:30)    PT/INR - ( 30 Aug 2018 15:30 )   PT: 12.10 sec;   INR: 1.12 ratio     PTT - ( 30 Aug 2018 15:30 )  PTT:31.5 sec      Blood Gas Profile - Venous (08.30.18 @ 15:42)    pH, Venous: 7.38    pCO2, Venous: 48 mmHg    pO2, Venous: 35 mmHg    HCO3, Venous: 28 mmoL/L    Base Excess, Venous: 2.2 mmoL/L    Oxygen Saturation, Venous: 66 %    FIO2, Venous: 21      CARDIAC MARKERS ( 30 Aug 2018 15:30 )  x     / <0.01 ng/mL / x     / x     / x        X-ray Chest 2 Views PA/Lat (08.30.18 @ 15:47)     Support devices: None.    Cardiac/mediastinum/hilum: Unremarkable.    Lung parenchyma/Pleura: Right perihilar/suprahilar opacity. No pleural   effusion or air leak    Skeleton/soft tissues: Unremarkable.    Impression:      Right perihilar/suprahilar opacity. No pleural effusion or air leak  A call back request was created

## 2022-12-28 NOTE — NURSING
Nurses Note -- 4 Eyes      12/27/2022   6:31 PM      Skin assessed during: Admit      [x] No Pressure Injuries Present    []Prevention Measures Documented        Wound Care Consulted? No    Attending Nurse:  Ray Fuentes RN     Second RN/Staff Member: Milana Nagy RN

## 2022-12-28 NOTE — PLAN OF CARE
Discharge planning discussed with patient and wife Radha [phone]. FOC obtained for [facility]. Referral sent via care port.

## 2022-12-28 NOTE — PROGRESS NOTES
Subjective:       Patient ID: Demetrio Wilson Jr. is a 52 y.o. male.    Chief Complaint: No chief complaint on file.    HPI  Review of Systems    Objective:      Physical Exam    Assessment:       1. Preop testing    2. Rectal cancer           Incision/Site 12/27/22 1155 Abdomen (Active)   12/27/22 1155   Present Prior to Hospital Arrival?:    Side:    Location: Abdomen   Orientation:    Incision Type:    Closure Method:    Additional Comments:    Removal Indication and Assessment:    Wound Outcome:    Removal Indications:    Incision WDL WDL 12/28/22 0927   Dressing Appearance Dry;Intact;Clean 12/28/22 0927   Drainage Amount None 12/28/22 0927   Appearance Dressing in place, unable to visualize 12/28/22 0927   Wound Edges Approximated 12/28/22 0927   Dressing Island/border 12/28/22 0927            Incision/Site 12/27/22 1155 Perineum (Active)   12/27/22 1155   Present Prior to Hospital Arrival?:    Side:    Location: Perineum   Orientation:    Incision Type:    Closure Method:    Additional Comments:    Removal Indication and Assessment:    Wound Outcome:    Removal Indications:    Incision WDL WDL 12/28/22 0927   Dressing Appearance Dry;Intact;Clean 12/28/22 0927   Dressing Gauze 12/28/22 0927           Plan:       51 y/o rectal cancer  post op day 1 new End colostomy.  Very sleepy but awakes to voice and answers but goes back to sleep.    Wife here with him.   Instructed on what a 2 piece system is and how his own looks.  Wife hesitant but verbalizes understanding of his needs.   His own system is intact but without stool or flatus but lynn red.     Video education left with spouse for her and him to watch.   Will return tomorrow for further instruction.

## 2022-12-28 NOTE — PLAN OF CARE
12/28/22 1145   Discharge Assessment   Assessment Type Discharge Planning Assessment   Source of Information patient   Reason For Admission rectal cancer   People in Home spouse   Do you expect to return to your current living situation? Yes   Do you have help at home or someone to help you manage your care at home? Yes   Who are your caregiver(s) and their phone number(s)? isaiah Garcia, [phone]   Prior to hospitilization cognitive status: Alert/Oriented   Current cognitive status: Alert/Oriented   Equipment Currently Used at Home none   Patient currently being followed by outpatient case management? No   Do you currently have service(s) that help you manage your care at home? No   Who is going to help you get home at discharge? Radha   How do you get to doctors appointments? car, drives self   Are you on dialysis? No   Do you take coumadin? No   Discharge Plan A Home Health;Home with family   Discharge Plan B Home Health;Home with family   DME Needed Upon Discharge  colostomy/ostomy supplies   Discharge Plan discussed with: Patient;Spouse/sig other   Name(s) and Number(s) isaiah Garcia, [phone]   Discharge Barriers Identified None     Pt states he lives with wife in single level home. Wife at bedside. She answered most of questions. Pt in pain at time of assessment. Wife states pt independent with ADL's prior to admission, able to drive. Pt does not have DME and not active with home health agency. Pt does not have a current PCP but wife states she is looking into getting patient a PCP. Will follow for discharge needs.

## 2022-12-29 PROCEDURE — 25000003 PHARM REV CODE 250: Performed by: COLON & RECTAL SURGERY

## 2022-12-29 PROCEDURE — 63600175 PHARM REV CODE 636 W HCPCS: Performed by: COLON & RECTAL SURGERY

## 2022-12-29 PROCEDURE — 11000001 HC ACUTE MED/SURG PRIVATE ROOM

## 2022-12-29 RX ADMIN — KETOROLAC TROMETHAMINE 30 MG: 30 INJECTION, SOLUTION INTRAMUSCULAR; INTRAVENOUS at 05:12

## 2022-12-29 RX ADMIN — GABAPENTIN 600 MG: 300 CAPSULE ORAL at 02:12

## 2022-12-29 RX ADMIN — MUPIROCIN: 20 OINTMENT TOPICAL at 09:12

## 2022-12-29 RX ADMIN — HYDROMORPHONE HYDROCHLORIDE 1 MG: 2 INJECTION INTRAMUSCULAR; INTRAVENOUS; SUBCUTANEOUS at 09:12

## 2022-12-29 RX ADMIN — POLYETHYLENE GLYCOL 3350 17 G: 17 POWDER, FOR SOLUTION ORAL at 09:12

## 2022-12-29 RX ADMIN — ACETAMINOPHEN 650 MG: 650 SOLUTION ORAL at 09:12

## 2022-12-29 RX ADMIN — GABAPENTIN 600 MG: 300 CAPSULE ORAL at 09:12

## 2022-12-29 RX ADMIN — KETOROLAC TROMETHAMINE 30 MG: 30 INJECTION, SOLUTION INTRAMUSCULAR; INTRAVENOUS at 12:12

## 2022-12-29 RX ADMIN — AMITRIPTYLINE HYDROCHLORIDE 75 MG: 25 TABLET, FILM COATED ORAL at 09:12

## 2022-12-29 RX ADMIN — KETOROLAC TROMETHAMINE 30 MG: 30 INJECTION, SOLUTION INTRAMUSCULAR; INTRAVENOUS at 11:12

## 2022-12-29 RX ADMIN — TIZANIDINE 4 MG: 4 TABLET ORAL at 09:12

## 2022-12-29 RX ADMIN — ACETAMINOPHEN 650 MG: 650 SOLUTION ORAL at 05:12

## 2022-12-29 RX ADMIN — ENOXAPARIN SODIUM 40 MG: 40 INJECTION SUBCUTANEOUS at 05:12

## 2022-12-29 NOTE — NURSING
12/29/22 0900        Colostomy 12/27/22 1208 LUQ   Placement Date/Time: 12/27/22 1208   Present Prior to Hospital Arrival?: No  Inserted by: MD  Location: LUQ   Stomal Appliance 2 piece;Intact;No Leakage   Stoma Appearance rosebud appearance;moist;red   Stoma Function flatus;no stool   Treatment   (Instructions)   Output (mL) 0 mL   Safety Management   Patient Rounds bed in low position;bed wheels locked;call light in patient/parent reach;clutter free environment maintained;ID band on;placement of personal items at bedside;toileting offered;visualized patient   Positioning   Body Position position changed independently     Post op day 2 new End colostomy.     Pt remains  awake alert oriented today.  He and his wife had viewed most of the video instructions provided.   Today I practiced with his wife on how to change out his 2 piece system with a model.   She would not look at his stoma so she may have problems.  He remains weak but he understands what he needs done just doesn't have the dexterity at present.   He has not been up yet-will note to nurse.  He sat on edge of bed yesterday with help.   Today hopefully walk the hallway.   He has flatus but no stool.     Tomorrow will change out his 2 piece system with both of them.

## 2022-12-29 NOTE — PROGRESS NOTES
CRS    POD 2 s/p APR    Sitting in the chair  Pain better controlled  Tolerating clears    Afebrile   VSS    Excellent UOP    Abdomen soft, appropriate  Colostomy pink with gas in bag  Drains slowing down      Plan - full liquids          - continue drains          - Hurst d/t pelvic surgery          - ET education          - Lovenox

## 2022-12-30 LAB
ALBUMIN SERPL-MCNC: 2.8 G/DL (ref 3.5–5)
ALBUMIN/GLOB SERPL: 1 RATIO (ref 1.1–2)
ALP SERPL-CCNC: 44 UNIT/L (ref 40–150)
ALT SERPL-CCNC: 8 UNIT/L (ref 0–55)
AST SERPL-CCNC: 16 UNIT/L (ref 5–34)
BASOPHILS # BLD AUTO: 0.01 X10(3)/MCL (ref 0–0.2)
BASOPHILS NFR BLD AUTO: 0.2 %
BILIRUBIN DIRECT+TOT PNL SERPL-MCNC: 0.5 MG/DL
BUN SERPL-MCNC: 12.9 MG/DL (ref 8.4–25.7)
CALCIUM SERPL-MCNC: 8.7 MG/DL (ref 8.4–10.2)
CHLORIDE SERPL-SCNC: 105 MMOL/L (ref 98–107)
CO2 SERPL-SCNC: 26 MMOL/L (ref 22–29)
CREAT SERPL-MCNC: 0.78 MG/DL (ref 0.73–1.18)
EOSINOPHIL # BLD AUTO: 0.1 X10(3)/MCL (ref 0–0.9)
EOSINOPHIL NFR BLD AUTO: 1.7 %
ERYTHROCYTE [DISTWIDTH] IN BLOOD BY AUTOMATED COUNT: 14 % (ref 11.6–14.4)
GFR SERPLBLD CREATININE-BSD FMLA CKD-EPI: >60 MLS/MIN/1.73/M2
GLOBULIN SER-MCNC: 2.9 GM/DL (ref 2.4–3.5)
GLUCOSE SERPL-MCNC: 87 MG/DL (ref 74–100)
HCT VFR BLD AUTO: 25.7 % (ref 42–52)
HGB BLD-MCNC: 8.1 GM/DL (ref 14–18)
IMM GRANULOCYTES # BLD AUTO: 0.08 X10(3)/MCL (ref 0–0.04)
IMM GRANULOCYTES NFR BLD AUTO: 1.4 %
LYMPHOCYTES # BLD AUTO: 0.7 X10(3)/MCL (ref 0.6–4.6)
LYMPHOCYTES NFR BLD AUTO: 11.9 %
MCH RBC QN AUTO: 26.9 PG
MCHC RBC AUTO-ENTMCNC: 31.5 MG/DL (ref 33–36)
MCV RBC AUTO: 85.4 FL (ref 80–94)
MONOCYTES # BLD AUTO: 0.65 X10(3)/MCL (ref 0.1–1.3)
MONOCYTES NFR BLD AUTO: 11.1 %
NEUTROPHILS # BLD AUTO: 4.32 X10(3)/MCL (ref 2.1–9.2)
NEUTROPHILS NFR BLD AUTO: 73.7 %
NRBC BLD AUTO-RTO: 0 % (ref 0–1)
PLATELET # BLD AUTO: 268 X10(3)/MCL (ref 140–371)
PMV BLD AUTO: 9 FL (ref 9.4–12.4)
POTASSIUM SERPL-SCNC: 3.8 MMOL/L (ref 3.5–5.1)
PROT SERPL-MCNC: 5.7 GM/DL (ref 6.4–8.3)
RBC # BLD AUTO: 3.01 X10(6)/MCL (ref 4.7–6.1)
SODIUM SERPL-SCNC: 138 MMOL/L (ref 136–145)
WBC # SPEC AUTO: 5.9 X10(3)/MCL (ref 4.5–11.5)

## 2022-12-30 PROCEDURE — 85025 COMPLETE CBC W/AUTO DIFF WBC: CPT | Performed by: COLON & RECTAL SURGERY

## 2022-12-30 PROCEDURE — 11000001 HC ACUTE MED/SURG PRIVATE ROOM

## 2022-12-30 PROCEDURE — 80053 COMPREHEN METABOLIC PANEL: CPT | Performed by: COLON & RECTAL SURGERY

## 2022-12-30 PROCEDURE — 25000003 PHARM REV CODE 250: Performed by: COLON & RECTAL SURGERY

## 2022-12-30 PROCEDURE — 36415 COLL VENOUS BLD VENIPUNCTURE: CPT | Performed by: COLON & RECTAL SURGERY

## 2022-12-30 PROCEDURE — 63600175 PHARM REV CODE 636 W HCPCS: Performed by: COLON & RECTAL SURGERY

## 2022-12-30 RX ORDER — CLONIDINE HYDROCHLORIDE 0.1 MG/1
0.1 TABLET ORAL 2 TIMES DAILY PRN
Status: DISCONTINUED | OUTPATIENT
Start: 2022-12-30 | End: 2023-01-04 | Stop reason: HOSPADM

## 2022-12-30 RX ORDER — HYDROMORPHONE HYDROCHLORIDE 2 MG/1
8 TABLET ORAL EVERY 4 HOURS PRN
Status: DISCONTINUED | OUTPATIENT
Start: 2022-12-30 | End: 2023-01-02

## 2022-12-30 RX ADMIN — MUPIROCIN: 20 OINTMENT TOPICAL at 08:12

## 2022-12-30 RX ADMIN — GABAPENTIN 600 MG: 300 CAPSULE ORAL at 08:12

## 2022-12-30 RX ADMIN — AMITRIPTYLINE HYDROCHLORIDE 75 MG: 25 TABLET, FILM COATED ORAL at 08:12

## 2022-12-30 RX ADMIN — TIZANIDINE 4 MG: 4 TABLET ORAL at 08:12

## 2022-12-30 RX ADMIN — MUPIROCIN: 20 OINTMENT TOPICAL at 10:12

## 2022-12-30 RX ADMIN — CLONIDINE HYDROCHLORIDE 0.1 MG: 0.1 TABLET ORAL at 06:12

## 2022-12-30 RX ADMIN — TIZANIDINE 4 MG: 4 TABLET ORAL at 05:12

## 2022-12-30 RX ADMIN — ACETAMINOPHEN 650 MG: 650 SOLUTION ORAL at 10:12

## 2022-12-30 RX ADMIN — KETOROLAC TROMETHAMINE 30 MG: 30 INJECTION, SOLUTION INTRAMUSCULAR; INTRAVENOUS at 12:12

## 2022-12-30 RX ADMIN — KETOROLAC TROMETHAMINE 30 MG: 30 INJECTION, SOLUTION INTRAMUSCULAR; INTRAVENOUS at 05:12

## 2022-12-30 RX ADMIN — ACETAMINOPHEN 650 MG: 650 SOLUTION ORAL at 02:12

## 2022-12-30 RX ADMIN — POLYETHYLENE GLYCOL 3350 17 G: 17 POWDER, FOR SOLUTION ORAL at 10:12

## 2022-12-30 RX ADMIN — ENOXAPARIN SODIUM 40 MG: 40 INJECTION SUBCUTANEOUS at 05:12

## 2022-12-30 RX ADMIN — GABAPENTIN 600 MG: 300 CAPSULE ORAL at 10:12

## 2022-12-30 RX ADMIN — GABAPENTIN 600 MG: 300 CAPSULE ORAL at 05:12

## 2022-12-30 NOTE — PROGRESS NOTES
"Inpatient Nutrition Evaluation    Admit Date: 12/27/2022   Total duration of encounter: 3 days    Nutrition Recommendation/Prescription     -Advance diet as tolerated per MD. Goal Diet: Low Residue Diet.   -Monitor wt, labs, and intake.     Nutrition Assessment     Chart Review    Reason Seen: continuous nutrition monitoring    Diagnosis:  Rectal cancer s/p Abdominoperineal resection    Relevant Medical History:   Constipation      GERD (gastroesophageal reflux disease)      Insomnia      Leg pain  bilateral    Rectal cancer      Rectal pain      Swelling of lower leg          Nutrition-Related Medications: LR    Nutrition-Related Labs:  12/28: Glu 122, GFR>60    Diet Order: Diet full liquid  Oral Supplement Order: none  Appetite/Oral Intake: good/% of meals  Factors Affecting Nutritional Intake: none identified  Food/Taoist/Cultural Preferences: none reported  Food Allergies: no known food allergies    Skin Integrity: drain/device(s), incision  Wound(s):   surgical incision     Comments    12/30: Pt reports good appetite/intake and usual good appetite prior to admit; denies n/v.     Anthropometrics    Height: 5' 8" (172.7 cm) Height Method: Stated  Last Weight: 87.1 kg (192 lb 0.3 oz) (12/27/22 0808) Weight Method: Standard Scale  BMI (Calculated): 29.2  BMI Classification: overweight (BMI 25-29.9)        Ideal Body Weight (IBW), Male: 154 lb     % Ideal Body Weight, Male (lb): 124.69 %                          Usual Weight Provided By: EMR weight history    Wt Readings from Last 3 Encounters:   12/27/22 0808 87.1 kg (192 lb 0.3 oz)   12/09/22 1329 88.5 kg (195 lb)      Weight Change(s) Since Admission:  Admit Weight: 88.5 kg (195 lb) (12/09/22 1329)      Patient Education    Not applicable.    Monitoring & Evaluation     Dietitian will monitor energy intake and weight change.  Nutrition Risk/Follow-Up: low (follow-up in 5-7 days)  Patients assigned 'low nutrition risk' status do not qualify for a full " nutritional assessment but will be monitored and re-evaluated in a 5-7 day time period. Please consult if re-evaluation needed sooner.

## 2022-12-30 NOTE — NURSING
"   12/30/22 1130        Colostomy 12/27/22 1208 LUQ   Placement Date/Time: 12/27/22 1208   Present Prior to Hospital Arrival?: No  Inserted by: MD  Location: LUQ   Stomal Appliance 2 piece;Intact;No Leakage   Stoma Appearance round;rosebud appearance;moist;swollen;red   Stoma Size (in) 1 5/8"  x 1 5/8"   Site Assessment Dry;Intact   Peristomal Assessment Dry;Intact   Stoma Function flatus;no stool   Treatment   (changed out his system with him)     Had planned on doing instructions this  8am with pt and his wife.   He is still very  week and unsteady.  She did not come.  I checked each each and at 11;30  I assisted him to sit on edge of bed and demonstrated how to change out his 2 piece system.   He verbalizes understanding but remains unsteady and hands are weak  with scissors and the 2 piece system.  Will return next week to continue.   Care concerns with nurse Cortés.    Enrolled in secure start program.     "

## 2022-12-30 NOTE — PROGRESS NOTES
"CRS    POD 3 s/p APR    Pain improving  Tolerating full liquids  Reports gas from colostomy  "I got up by myself yesterday"    Afebrile   VSS    Great UOP    Abdomen soft, incision c/d/I  Colostomy edematous but pink  Drains clearing up    WBC 5.9     Hgb 8.1    Creat 0.7      Plan - fulls          - Hurst          - drains          - mobilize          - ET education          - Lovenox  "

## 2022-12-31 LAB
ABO + RH BLD: NORMAL
ABO + RH BLD: NORMAL
ALBUMIN SERPL-MCNC: 3.1 G/DL (ref 3.5–5)
ALBUMIN/GLOB SERPL: 0.8 RATIO (ref 1.1–2)
ALP SERPL-CCNC: 53 UNIT/L (ref 40–150)
ALT SERPL-CCNC: 9 UNIT/L (ref 0–55)
AST SERPL-CCNC: 13 UNIT/L (ref 5–34)
BASOPHILS # BLD AUTO: 0.02 X10(3)/MCL (ref 0–0.2)
BASOPHILS NFR BLD AUTO: 0.3 %
BILIRUBIN DIRECT+TOT PNL SERPL-MCNC: 0.6 MG/DL
BLD PROD TYP BPU: NORMAL
BLD PROD TYP BPU: NORMAL
BLOOD UNIT EXPIRATION DATE: NORMAL
BLOOD UNIT EXPIRATION DATE: NORMAL
BLOOD UNIT TYPE CODE: 5100
BLOOD UNIT TYPE CODE: 5100
BUN SERPL-MCNC: 12.5 MG/DL (ref 8.4–25.7)
CALCIUM SERPL-MCNC: 9 MG/DL (ref 8.4–10.2)
CHLORIDE SERPL-SCNC: 102 MMOL/L (ref 98–107)
CO2 SERPL-SCNC: 22 MMOL/L (ref 22–29)
CREAT SERPL-MCNC: 0.87 MG/DL (ref 0.73–1.18)
CROSSMATCH INTERPRETATION: NORMAL
CROSSMATCH INTERPRETATION: NORMAL
DISPENSE STATUS: NORMAL
DISPENSE STATUS: NORMAL
EOSINOPHIL # BLD AUTO: 0 X10(3)/MCL (ref 0–0.9)
EOSINOPHIL NFR BLD AUTO: 0 %
ERYTHROCYTE [DISTWIDTH] IN BLOOD BY AUTOMATED COUNT: 13.8 % (ref 11.6–14.4)
GFR SERPLBLD CREATININE-BSD FMLA CKD-EPI: >60 MLS/MIN/1.73/M2
GLOBULIN SER-MCNC: 4.1 GM/DL (ref 2.4–3.5)
GLUCOSE SERPL-MCNC: 104 MG/DL (ref 74–100)
HCT VFR BLD AUTO: 28.7 % (ref 42–52)
HGB BLD-MCNC: 9.2 GM/DL (ref 14–18)
IMM GRANULOCYTES # BLD AUTO: 0.08 X10(3)/MCL (ref 0–0.04)
IMM GRANULOCYTES NFR BLD AUTO: 1.2 %
LACTATE SERPL-SCNC: 1.3 MMOL/L (ref 0.5–2.2)
LYMPHOCYTES # BLD AUTO: 0.39 X10(3)/MCL (ref 0.6–4.6)
LYMPHOCYTES NFR BLD AUTO: 5.8 %
MCH RBC QN AUTO: 26.9 PG
MCHC RBC AUTO-ENTMCNC: 32.1 MG/DL (ref 33–36)
MCV RBC AUTO: 83.9 FL (ref 80–94)
MONOCYTES # BLD AUTO: 0.38 X10(3)/MCL (ref 0.1–1.3)
MONOCYTES NFR BLD AUTO: 5.7 %
NEUTROPHILS # BLD AUTO: 5.8 X10(3)/MCL (ref 2.1–9.2)
NEUTROPHILS NFR BLD AUTO: 87 %
NRBC BLD AUTO-RTO: 0 % (ref 0–1)
PLATELET # BLD AUTO: 347 X10(3)/MCL (ref 140–371)
PMV BLD AUTO: 8.7 FL (ref 9.4–12.4)
POTASSIUM SERPL-SCNC: 3.8 MMOL/L (ref 3.5–5.1)
PROT SERPL-MCNC: 7.2 GM/DL (ref 6.4–8.3)
RBC # BLD AUTO: 3.42 X10(6)/MCL (ref 4.7–6.1)
SODIUM SERPL-SCNC: 135 MMOL/L (ref 136–145)
UNIT NUMBER: NORMAL
UNIT NUMBER: NORMAL
WBC # SPEC AUTO: 6.7 X10(3)/MCL (ref 4.5–11.5)

## 2022-12-31 PROCEDURE — 25000003 PHARM REV CODE 250: Performed by: SURGERY

## 2022-12-31 PROCEDURE — 80053 COMPREHEN METABOLIC PANEL: CPT | Performed by: SURGERY

## 2022-12-31 PROCEDURE — 25000003 PHARM REV CODE 250: Performed by: COLON & RECTAL SURGERY

## 2022-12-31 PROCEDURE — 36415 COLL VENOUS BLD VENIPUNCTURE: CPT | Performed by: SURGERY

## 2022-12-31 PROCEDURE — 83605 ASSAY OF LACTIC ACID: CPT | Performed by: SURGERY

## 2022-12-31 PROCEDURE — 63600175 PHARM REV CODE 636 W HCPCS: Performed by: COLON & RECTAL SURGERY

## 2022-12-31 PROCEDURE — 85025 COMPLETE CBC W/AUTO DIFF WBC: CPT | Performed by: SURGERY

## 2022-12-31 PROCEDURE — 63600175 PHARM REV CODE 636 W HCPCS: Performed by: SURGERY

## 2022-12-31 PROCEDURE — 11000001 HC ACUTE MED/SURG PRIVATE ROOM

## 2022-12-31 RX ORDER — HYDROMORPHONE HYDROCHLORIDE 2 MG/ML
1 INJECTION, SOLUTION INTRAMUSCULAR; INTRAVENOUS; SUBCUTANEOUS EVERY 4 HOURS PRN
Status: DISCONTINUED | OUTPATIENT
Start: 2022-12-31 | End: 2022-12-31

## 2022-12-31 RX ORDER — HYDROMORPHONE HYDROCHLORIDE 2 MG/ML
1.5 INJECTION, SOLUTION INTRAMUSCULAR; INTRAVENOUS; SUBCUTANEOUS
Status: DISCONTINUED | OUTPATIENT
Start: 2022-12-31 | End: 2023-01-02

## 2022-12-31 RX ORDER — ZOLPIDEM TARTRATE 5 MG/1
5 TABLET ORAL ONCE
Status: COMPLETED | OUTPATIENT
Start: 2022-12-31 | End: 2022-12-31

## 2022-12-31 RX ORDER — ACETAMINOPHEN 325 MG/1
650 TABLET ORAL EVERY 6 HOURS PRN
Status: DISCONTINUED | OUTPATIENT
Start: 2022-12-31 | End: 2023-01-04 | Stop reason: HOSPADM

## 2022-12-31 RX ORDER — ZOLPIDEM TARTRATE 5 MG/1
5 TABLET ORAL NIGHTLY PRN
Status: DISCONTINUED | OUTPATIENT
Start: 2022-12-31 | End: 2023-01-04 | Stop reason: HOSPADM

## 2022-12-31 RX ADMIN — ACETAMINOPHEN 650 MG: 325 TABLET, FILM COATED ORAL at 06:12

## 2022-12-31 RX ADMIN — HYDROMORPHONE HYDROCHLORIDE 8 MG: 2 TABLET ORAL at 09:12

## 2022-12-31 RX ADMIN — ENOXAPARIN SODIUM 40 MG: 40 INJECTION SUBCUTANEOUS at 04:12

## 2022-12-31 RX ADMIN — AMITRIPTYLINE HYDROCHLORIDE 75 MG: 25 TABLET, FILM COATED ORAL at 09:12

## 2022-12-31 RX ADMIN — POLYETHYLENE GLYCOL 3350 17 G: 17 POWDER, FOR SOLUTION ORAL at 08:12

## 2022-12-31 RX ADMIN — TIZANIDINE 4 MG: 4 TABLET ORAL at 06:12

## 2022-12-31 RX ADMIN — HYDROMORPHONE HYDROCHLORIDE 1 MG: 2 INJECTION INTRAMUSCULAR; INTRAVENOUS; SUBCUTANEOUS at 06:12

## 2022-12-31 RX ADMIN — MUPIROCIN: 20 OINTMENT TOPICAL at 08:12

## 2022-12-31 RX ADMIN — ZOLPIDEM TARTRATE 5 MG: 5 TABLET ORAL at 11:12

## 2022-12-31 RX ADMIN — SODIUM CHLORIDE, POTASSIUM CHLORIDE, SODIUM LACTATE AND CALCIUM CHLORIDE: 600; 310; 30; 20 INJECTION, SOLUTION INTRAVENOUS at 02:12

## 2022-12-31 RX ADMIN — GABAPENTIN 600 MG: 300 CAPSULE ORAL at 08:12

## 2022-12-31 RX ADMIN — PIPERACILLIN SODIUM,TAZOBACTAM SODIUM 4.5 G: 4; .5 INJECTION, POWDER, FOR SOLUTION INTRAVENOUS at 09:12

## 2022-12-31 RX ADMIN — HYDROMORPHONE HYDROCHLORIDE 8 MG: 2 TABLET ORAL at 06:12

## 2022-12-31 RX ADMIN — HYDROMORPHONE HYDROCHLORIDE 8 MG: 2 TABLET ORAL at 04:12

## 2022-12-31 NOTE — PROGRESS NOTES
CRS    POD 4 s/p APR    Pain controlled  Tolerating full liquids  Reports gas from colostomy    Afebrile   VSS    Abdomen soft, colostomy pink  Drains slowing down  Perineum with mild drainage      Plan - d/c Hurst          - d/c drain #2          - advance diet          - ambulate          - Lovenox

## 2023-01-01 PROCEDURE — 25000003 PHARM REV CODE 250: Performed by: SURGERY

## 2023-01-01 PROCEDURE — 63600175 PHARM REV CODE 636 W HCPCS: Performed by: COLON & RECTAL SURGERY

## 2023-01-01 PROCEDURE — 63600175 PHARM REV CODE 636 W HCPCS: Performed by: SURGERY

## 2023-01-01 PROCEDURE — 25000003 PHARM REV CODE 250: Performed by: COLON & RECTAL SURGERY

## 2023-01-01 PROCEDURE — 11000001 HC ACUTE MED/SURG PRIVATE ROOM

## 2023-01-01 RX ORDER — ADHESIVE BANDAGE
30 BANDAGE TOPICAL DAILY PRN
Status: DISCONTINUED | OUTPATIENT
Start: 2023-01-01 | End: 2023-01-04 | Stop reason: HOSPADM

## 2023-01-01 RX ORDER — SODIUM CHLORIDE, SODIUM LACTATE, POTASSIUM CHLORIDE, CALCIUM CHLORIDE 600; 310; 30; 20 MG/100ML; MG/100ML; MG/100ML; MG/100ML
INJECTION, SOLUTION INTRAVENOUS CONTINUOUS
Status: DISCONTINUED | OUTPATIENT
Start: 2023-01-01 | End: 2023-01-03

## 2023-01-01 RX ADMIN — ONDANSETRON 4 MG: 2 INJECTION INTRAMUSCULAR; INTRAVENOUS at 01:01

## 2023-01-01 RX ADMIN — POLYETHYLENE GLYCOL 3350 17 G: 17 POWDER, FOR SOLUTION ORAL at 09:01

## 2023-01-01 RX ADMIN — HYDROMORPHONE HYDROCHLORIDE 8 MG: 2 TABLET ORAL at 02:01

## 2023-01-01 RX ADMIN — PIPERACILLIN SODIUM,TAZOBACTAM SODIUM 4.5 G: 4; .5 INJECTION, POWDER, FOR SOLUTION INTRAVENOUS at 05:01

## 2023-01-01 RX ADMIN — ZOLPIDEM TARTRATE 5 MG: 5 TABLET, COATED ORAL at 10:01

## 2023-01-01 RX ADMIN — MAGNESIUM HYDROXIDE 2400 MG: 400 SUSPENSION ORAL at 06:01

## 2023-01-01 RX ADMIN — MUPIROCIN: 20 OINTMENT TOPICAL at 09:01

## 2023-01-01 RX ADMIN — ENOXAPARIN SODIUM 40 MG: 40 INJECTION SUBCUTANEOUS at 06:01

## 2023-01-01 RX ADMIN — AMITRIPTYLINE HYDROCHLORIDE 75 MG: 25 TABLET, FILM COATED ORAL at 10:01

## 2023-01-01 RX ADMIN — PIPERACILLIN SODIUM,TAZOBACTAM SODIUM 4.5 G: 4; .5 INJECTION, POWDER, FOR SOLUTION INTRAVENOUS at 10:01

## 2023-01-01 RX ADMIN — PIPERACILLIN SODIUM,TAZOBACTAM SODIUM 4.5 G: 4; .5 INJECTION, POWDER, FOR SOLUTION INTRAVENOUS at 02:01

## 2023-01-01 RX ADMIN — SODIUM CHLORIDE, POTASSIUM CHLORIDE, SODIUM LACTATE AND CALCIUM CHLORIDE: 600; 310; 30; 20 INJECTION, SOLUTION INTRAVENOUS at 02:01

## 2023-01-01 NOTE — PROGRESS NOTES
Personally seen and examined, called by nursing staff to evaluate patient because of tachycardia with heart rate 120, and fever to 100.3.    I entered patient's room to find him up on his feet, he appeared well and despite being tachycardic and febrile, he displays no fever chills, malaise, and pain appears to be somewhat well-controlled and he is ambulating with no assistance     Heart rate is 120, fevers 100.3, he is awake alert x4 in his no acute distress   Normocephalic atraumatic, cranial nerves 2-12 are grossly intact bilaterally, no upper or lower motor or sensory deficits, tachycardic, no rubs murmurs or gallops   Clear to auscultation bilaterally no wheezes rales rhonchi   Abdomen is mildly distended, ostomy is pink viable, some gas within ostomy appendage but no stool, perineal wound appears in good shape with no significant leaking or cellulitis     52-year-old male postop day 4 status post abdominal perineal resection for rectal cancer     Increased frequency with pain medication, I started Zosyn, I see no evidence of cellulitis or infection.    However if he remains tachycardic overnight and febrile, I will likely order a CT scan in the a.m.

## 2023-01-01 NOTE — PROGRESS NOTES
CRS    POD 5 s/p APR    Febrile last night  He c/o pain in his groins  Tolerating diet  Reports gas from colostomy    Tm 103    VSS    Urinating well    Abdomen mildly distended but soft  Colostomy edematous but pink  Drain with serous output  Perineal wound with mild drainage      Plan - continue pelvic drain          - monitor for fever          - MOM          - pain control          - ambulate          - Lovenox

## 2023-01-02 PROCEDURE — 11000001 HC ACUTE MED/SURG PRIVATE ROOM

## 2023-01-02 PROCEDURE — 25000003 PHARM REV CODE 250: Performed by: COLON & RECTAL SURGERY

## 2023-01-02 PROCEDURE — 63600175 PHARM REV CODE 636 W HCPCS: Performed by: COLON & RECTAL SURGERY

## 2023-01-02 PROCEDURE — 63600175 PHARM REV CODE 636 W HCPCS: Performed by: SURGERY

## 2023-01-02 PROCEDURE — 25000003 PHARM REV CODE 250: Performed by: SURGERY

## 2023-01-02 RX ORDER — HYDROMORPHONE HYDROCHLORIDE 2 MG/1
4 TABLET ORAL EVERY 4 HOURS PRN
Status: DISCONTINUED | OUTPATIENT
Start: 2023-01-02 | End: 2023-01-03

## 2023-01-02 RX ORDER — HYDROMORPHONE HYDROCHLORIDE 2 MG/ML
1 INJECTION, SOLUTION INTRAMUSCULAR; INTRAVENOUS; SUBCUTANEOUS
Status: DISCONTINUED | OUTPATIENT
Start: 2023-01-02 | End: 2023-01-04 | Stop reason: HOSPADM

## 2023-01-02 RX ADMIN — POLYETHYLENE GLYCOL 3350 17 G: 17 POWDER, FOR SOLUTION ORAL at 09:01

## 2023-01-02 RX ADMIN — AMITRIPTYLINE HYDROCHLORIDE 75 MG: 25 TABLET, FILM COATED ORAL at 09:01

## 2023-01-02 RX ADMIN — SODIUM CHLORIDE, POTASSIUM CHLORIDE, SODIUM LACTATE AND CALCIUM CHLORIDE: 600; 310; 30; 20 INJECTION, SOLUTION INTRAVENOUS at 09:01

## 2023-01-02 RX ADMIN — PIPERACILLIN SODIUM,TAZOBACTAM SODIUM 4.5 G: 4; .5 INJECTION, POWDER, FOR SOLUTION INTRAVENOUS at 09:01

## 2023-01-02 RX ADMIN — PIPERACILLIN SODIUM,TAZOBACTAM SODIUM 4.5 G: 4; .5 INJECTION, POWDER, FOR SOLUTION INTRAVENOUS at 06:01

## 2023-01-02 RX ADMIN — ENOXAPARIN SODIUM 40 MG: 40 INJECTION SUBCUTANEOUS at 05:01

## 2023-01-02 RX ADMIN — ZOLPIDEM TARTRATE 5 MG: 5 TABLET, COATED ORAL at 09:01

## 2023-01-02 RX ADMIN — PIPERACILLIN SODIUM,TAZOBACTAM SODIUM 4.5 G: 4; .5 INJECTION, POWDER, FOR SOLUTION INTRAVENOUS at 03:01

## 2023-01-02 RX ADMIN — SODIUM CHLORIDE, POTASSIUM CHLORIDE, SODIUM LACTATE AND CALCIUM CHLORIDE: 600; 310; 30; 20 INJECTION, SOLUTION INTRAVENOUS at 06:01

## 2023-01-02 NOTE — PROGRESS NOTES
CRS    POD 6 s/p APR    Had a couple episodes of emesis  Denies abdominal pain  No stool from colostomy  Not ambulating much    Afebrile   VSS    Urinating fine    Abdomen soft, NT, colostomy pink  Drain with serosanguineous ouput  Perineal wound with serosanguinous drainage      Plan - check flat & erect          - clear liquids          - decrease pain meds          - needs to ambulate more          - Lovenox

## 2023-01-02 NOTE — ANESTHESIA PROCEDURE NOTES
Intubation    Date/Time: 12/27/2022 8:42 AM  Performed by: Jim Bautista  Authorized by: Noe Ramirez Jr., MD     Intubation:     Induction:  Intravenous    Intubated:  Postinduction    Mask Ventilation:  Easy mask    Attempts:  1    Attempted By:  Student    Method of Intubation:  Direct    Blade:  Villasenor 2    Laryngeal View Grade: Grade I - full view of cords      Difficult Airway Encountered?: No      Complications:  None    Airway Device:  Oral endotracheal tube    Airway Device Size:  7.5    Style/Cuff Inflation:  Cuffed (inflated to minimal occlusive pressure)    Tube secured:  23    Secured at:  The lips    Placement Verified By:  Capnometry    Complicating Factors:  None    Findings Post-Intubation:  BS equal bilateral     Home

## 2023-01-03 LAB
ALBUMIN SERPL-MCNC: 3.1 G/DL (ref 3.5–5)
ALBUMIN/GLOB SERPL: 0.7 RATIO (ref 1.1–2)
ALP SERPL-CCNC: 58 UNIT/L (ref 40–150)
ALT SERPL-CCNC: 9 UNIT/L (ref 0–55)
AST SERPL-CCNC: 16 UNIT/L (ref 5–34)
BASOPHILS # BLD AUTO: 0.02 X10(3)/MCL (ref 0–0.2)
BASOPHILS NFR BLD AUTO: 0.4 %
BILIRUBIN DIRECT+TOT PNL SERPL-MCNC: 0.4 MG/DL
BUN SERPL-MCNC: 9.1 MG/DL (ref 8.4–25.7)
CALCIUM SERPL-MCNC: 9 MG/DL (ref 8.4–10.2)
CHLORIDE SERPL-SCNC: 105 MMOL/L (ref 98–107)
CO2 SERPL-SCNC: 22 MMOL/L (ref 22–29)
CREAT SERPL-MCNC: 0.8 MG/DL (ref 0.73–1.18)
DHEA SERPL-MCNC: NORMAL
EOSINOPHIL # BLD AUTO: 0.02 X10(3)/MCL (ref 0–0.9)
EOSINOPHIL NFR BLD AUTO: 0.4 %
ERYTHROCYTE [DISTWIDTH] IN BLOOD BY AUTOMATED COUNT: 14.2 % (ref 11.6–14.4)
ESTROGEN SERPL-MCNC: NORMAL PG/ML
GFR SERPLBLD CREATININE-BSD FMLA CKD-EPI: >90 MLS/MIN/1.73/M2
GLOBULIN SER-MCNC: 4.3 GM/DL (ref 2.4–3.5)
GLUCOSE SERPL-MCNC: 92 MG/DL (ref 74–100)
HCT VFR BLD AUTO: 26.5 % (ref 42–52)
HGB BLD-MCNC: 8.5 GM/DL (ref 14–18)
IMM GRANULOCYTES # BLD AUTO: 0.28 X10(3)/MCL (ref 0–0.04)
IMM GRANULOCYTES NFR BLD AUTO: 5.5 %
INSULIN SERPL-ACNC: NORMAL U[IU]/ML
LAB AP CLINICAL INFORMATION: NORMAL
LAB AP GROSS DESCRIPTION: NORMAL
LAB AP REPORT FOOTNOTES: NORMAL
LYMPHOCYTES # BLD AUTO: 0.77 X10(3)/MCL (ref 0.6–4.6)
LYMPHOCYTES NFR BLD AUTO: 15 %
MCH RBC QN AUTO: 27.2 PG
MCHC RBC AUTO-ENTMCNC: 32.1 MG/DL (ref 33–36)
MCV RBC AUTO: 84.7 FL (ref 80–94)
MONOCYTES # BLD AUTO: 0.63 X10(3)/MCL (ref 0.1–1.3)
MONOCYTES NFR BLD AUTO: 12.3 %
NEUTROPHILS # BLD AUTO: 3.4 X10(3)/MCL (ref 2.1–9.2)
NEUTROPHILS NFR BLD AUTO: 66.4 %
NRBC BLD AUTO-RTO: 0 % (ref 0–1)
PLATELET # BLD AUTO: 292 X10(3)/MCL (ref 140–371)
PMV BLD AUTO: 9 FL (ref 9.4–12.4)
POTASSIUM SERPL-SCNC: 3.9 MMOL/L (ref 3.5–5.1)
PROT SERPL-MCNC: 7.4 GM/DL (ref 6.4–8.3)
RBC # BLD AUTO: 3.13 X10(6)/MCL (ref 4.7–6.1)
SODIUM SERPL-SCNC: 136 MMOL/L (ref 136–145)
T3RU NFR SERPL: NORMAL %
WBC # SPEC AUTO: 5.1 X10(3)/MCL (ref 4.5–11.5)

## 2023-01-03 PROCEDURE — 80053 COMPREHEN METABOLIC PANEL: CPT | Performed by: COLON & RECTAL SURGERY

## 2023-01-03 PROCEDURE — 11000001 HC ACUTE MED/SURG PRIVATE ROOM

## 2023-01-03 PROCEDURE — 63600175 PHARM REV CODE 636 W HCPCS: Performed by: COLON & RECTAL SURGERY

## 2023-01-03 PROCEDURE — 63600175 PHARM REV CODE 636 W HCPCS: Performed by: SURGERY

## 2023-01-03 PROCEDURE — 85025 COMPLETE CBC W/AUTO DIFF WBC: CPT | Performed by: COLON & RECTAL SURGERY

## 2023-01-03 PROCEDURE — 25000003 PHARM REV CODE 250: Performed by: COLON & RECTAL SURGERY

## 2023-01-03 PROCEDURE — 25000003 PHARM REV CODE 250: Performed by: SURGERY

## 2023-01-03 PROCEDURE — 36415 COLL VENOUS BLD VENIPUNCTURE: CPT | Performed by: COLON & RECTAL SURGERY

## 2023-01-03 RX ORDER — TAMSULOSIN HYDROCHLORIDE 0.4 MG/1
0.4 CAPSULE ORAL DAILY
Status: DISCONTINUED | OUTPATIENT
Start: 2023-01-03 | End: 2023-01-04 | Stop reason: HOSPADM

## 2023-01-03 RX ORDER — HYDROMORPHONE HYDROCHLORIDE 2 MG/1
2 TABLET ORAL EVERY 4 HOURS PRN
Status: DISCONTINUED | OUTPATIENT
Start: 2023-01-03 | End: 2023-01-04 | Stop reason: HOSPADM

## 2023-01-03 RX ADMIN — TAMSULOSIN HYDROCHLORIDE 0.4 MG: 0.4 CAPSULE ORAL at 02:01

## 2023-01-03 RX ADMIN — ENOXAPARIN SODIUM 40 MG: 40 INJECTION SUBCUTANEOUS at 06:01

## 2023-01-03 RX ADMIN — POLYETHYLENE GLYCOL 3350 17 G: 17 POWDER, FOR SOLUTION ORAL at 09:01

## 2023-01-03 RX ADMIN — AMITRIPTYLINE HYDROCHLORIDE 75 MG: 25 TABLET, FILM COATED ORAL at 09:01

## 2023-01-03 RX ADMIN — CLONIDINE HYDROCHLORIDE 0.1 MG: 0.1 TABLET ORAL at 06:01

## 2023-01-03 RX ADMIN — PIPERACILLIN SODIUM,TAZOBACTAM SODIUM 4.5 G: 4; .5 INJECTION, POWDER, FOR SOLUTION INTRAVENOUS at 07:01

## 2023-01-03 RX ADMIN — ZOLPIDEM TARTRATE 5 MG: 5 TABLET, COATED ORAL at 09:01

## 2023-01-03 NOTE — PROGRESS NOTES
CRS    POD 7 s/p APR    Feeling much better  Tolerating liquids, no N/V  Now with gas and stool from his colostomy  He c/o some urinary hesitancy    Afebrile   VSS    Good UOP    Abdomen soft, colostomy pink with stool in bag  Drain with minimal serosanguinous output  Perineal wound with serosanguinous drainage    Path - T3N1b      Plan - advance diet          - d/c drain          - ET education          - Lovenox          - hopefully home tomorrow

## 2023-01-03 NOTE — PLAN OF CARE
Problem: Adult Inpatient Plan of Care  Goal: Plan of Care Review  1/3/2023 0232 by Carla Rico RN  Outcome: Ongoing, Progressing  1/3/2023 0232 by Carla Rico RN  Outcome: Ongoing, Progressing  Goal: Patient-Specific Goal (Individualized)  1/3/2023 0232 by Carla Rico RN  Outcome: Ongoing, Progressing  1/3/2023 0232 by Carla Rico RN  Outcome: Ongoing, Progressing  Goal: Absence of Hospital-Acquired Illness or Injury  1/3/2023 0232 by Carla Rico RN  Outcome: Ongoing, Progressing  1/3/2023 0232 by Carla Rico RN  Outcome: Ongoing, Progressing  Goal: Optimal Comfort and Wellbeing  Outcome: Ongoing, Progressing  Goal: Readiness for Transition of Care  Outcome: Ongoing, Progressing     Problem: Infection  Goal: Absence of Infection Signs and Symptoms  Outcome: Ongoing, Progressing     Problem: Pain Acute  Goal: Acceptable Pain Control and Functional Ability  Outcome: Ongoing, Progressing     Problem: Fall Injury Risk  Goal: Absence of Fall and Fall-Related Injury  Outcome: Ongoing, Progressing

## 2023-01-03 NOTE — NURSING
"   01/03/23 0900        Colostomy 12/27/22 1208 LUQ   Placement Date/Time: 12/27/22 1208   Present Prior to Hospital Arrival?: No  Inserted by: MD  Location: LUQ   Stomal Appliance 2 piece;Intact;No Leakage   Stoma Appearance rosebud appearance;moist;red;swollen   Stoma Size (in) 1 5/8" x 1 5/8"   Site Assessment Intact;Clean;Dry   Peristomal Assessment Dry;Intact   Stoma Function flatus;stool   Treatment   (assisted change out of his 2 piece system)   Tolerance no signs/symptoms of discomfort   Output (mL) 200 mL   Safety Management   Patient Rounds bed in low position;bed wheels locked;clutter free environment maintained;call light in patient/parent reach;visualized patient;toileting offered;placement of personal items at bedside;ID band on   Daily Care   Activity Management Up in chair - L3     Worked with pt himself  sitting on bed edge to change out his system.   He understands the process he remains weak and a little unsteady.   Spouse not here today.   He was able to assist with removal of his barrier-meassuring of his stoma and cutting out new template--all with much assisstance.     Stoma remains swollen-it is working with loose brown stool.   Will continue to work with him.day to day.    "

## 2023-01-04 VITALS
RESPIRATION RATE: 20 BRPM | BODY MASS INDEX: 29.1 KG/M2 | TEMPERATURE: 100 F | HEART RATE: 102 BPM | HEIGHT: 68 IN | SYSTOLIC BLOOD PRESSURE: 146 MMHG | WEIGHT: 192 LBS | OXYGEN SATURATION: 95 % | DIASTOLIC BLOOD PRESSURE: 90 MMHG

## 2023-01-04 PROCEDURE — 94761 N-INVAS EAR/PLS OXIMETRY MLT: CPT

## 2023-01-04 PROCEDURE — 25000003 PHARM REV CODE 250: Performed by: COLON & RECTAL SURGERY

## 2023-01-04 RX ORDER — AMOXICILLIN AND CLAVULANATE POTASSIUM 875; 125 MG/1; MG/1
1 TABLET, FILM COATED ORAL EVERY 12 HOURS
Qty: 14 TABLET | Refills: 0 | Status: SHIPPED | OUTPATIENT
Start: 2023-01-04 | End: 2023-01-11

## 2023-01-04 RX ORDER — HYDROMORPHONE HYDROCHLORIDE 2 MG/1
2 TABLET ORAL EVERY 4 HOURS PRN
Qty: 30 TABLET | Refills: 0 | Status: SHIPPED | OUTPATIENT
Start: 2023-01-04

## 2023-01-04 RX ORDER — ALFUZOSIN HYDROCHLORIDE 10 MG/1
10 TABLET, EXTENDED RELEASE ORAL
Qty: 30 TABLET | Refills: 0 | Status: SHIPPED | OUTPATIENT
Start: 2023-01-04

## 2023-01-04 RX ADMIN — TAMSULOSIN HYDROCHLORIDE 0.4 MG: 0.4 CAPSULE ORAL at 10:01

## 2023-01-04 NOTE — PHYSICIAN QUERY
PT Name: Demetrio Wilson Jr.  MR #: [id number]    DOCUMENTATION CLARIFICATION     CDS: Idalia SAAVEDRA RN  Contact information: [email or web address]  This form is a permanent document in the medical record.     Query Date: January 4, 2023    By submitting this query, we are merely seeking further clarification of documentation.  Please utilize your independent clinical judgment when addressing the question(s) below.    The medical record contains the following:  Pathology Findings Location in Medical Record   -- METASTATIC ADENOCARCINOMA INVOLVES THREE OF FIFTEEN LYMPH NODES (3/15).  -- GREATEST DIMENSION OF METASTATIC TUMOR DEPOSIT IN LYMPH NODE, 6 MM.  -- EXTRANODAL EXTENSION, PRESENT.  -- SEVERAL LYMPH NODES EXHIBIT THERAPY EFFECT.     REGIONAL LYMPH NODES        Regional Lymph Node Status: Regional lymph nodes present             Tumor present in regional lymph node(s)        Number of Lymph Nodes with Tumor: Exact number : 3        Number of Lymph Nodes Examined: Exact number : 15        Tumor Deposits: Present        Number of Tumor Deposits: Specify number: 3     PATHOLOGIC STAGE CLASSIFICATION (pTNM, AJCC 8th Edition)        TNM Descriptors: y (post-treatment)        pT Category: pT3: Tumor invades through the muscularis propria into pericolorectal tissues        pN Category: pN1b: Two or three regional lymph nodes are positive   Surgical Pathology 12/27/2022              Surgical Pathology 12/27/2022                  Surgical Pathology 12/27/2022       Please clarify the pathology findings.  [ X ] Pathology findings noted above are ruled in/confirmed as diagnoses   [  ] Pathology findings noted above are not confirmed as diagnoses   [  ] Pathology findings noted above are incidental   [  ] Other diagnosis (please specify): ___________   [  ] Clinically Undetermined     Please document in your progress notes daily for the duration of treatment until resolved and include in your discharge summary.    Form  No. [id number]

## 2023-01-04 NOTE — PLAN OF CARE
Sent discharge summary and AVS to [facility] via care [facility].

## 2023-01-04 NOTE — DISCHARGE SUMMARY
[facility] - 8th Floor Med Surg  General Surgery  Discharge Summary      Patient Name: Demetrio Wilson Jr.  MRN: [id number]  Admission Date: 12/27/2022  Hospital Length of Stay: 8 days  Discharge Date and Time:  01/04/2023 9:04 AM  Attending Physician: Bob Rahman MD   Discharging Provider: Bob Rahman MD  Primary Care Provider: Primary Doctor No    HPI:   No notes on file    Procedure(s) (LRB):  PROCTECTOMY, ABDOMINOPERINEAL (N/A)      Indwelling Lines/Drains at time of discharge:   Lines/Drains/Airways     Drain  Duration                Closed/Suction Drain 12/27/22 1217 Superior Abdomen Bulb 19 Fr. 7 days         Colostomy 12/27/22 1208 LUQ 7 days              Hospital Course: Patient is a 52-year-old black male who had a massive lower GI bleed resulting in syncope in April for which he was brought to the emergency room.  Colonoscopy revealed a large rectal polyp so he was referred to Dr. Fan Juarez.  He underwent flexible sigmoidoscopy 05/05/2022 at which time he was diagnosed with a biopsy-proven rectal adenocarcinoma just above the dentate line.  Staging MRI showed a T3 N2 lesion so he underwent neoadjuvant radiation which was completed sometime in October.  The patient was reluctant to have surgery because he did not want a permanent colostomy bag.  He continued to have pain and has been on Dilaudid 8 mg orally.  He was finally amenable to surgery and was admitted through day surgery on 12/27/2022.  He underwent an abdominoperineal resection without complication.  Please see operative note for full details.  Postoperatively he was admitted to the general surgery floor.  Clear liquids diet was initiated and slowly advanced as tolerated.  Lovenox was administered daily.  Enterostomal therapist was consulted for colostomy care and education.  Hurst catheter remained in place until postoperative day 4 due to the deep pelvic dissection, and e was able to void after it was removed.   The following night he had febrile episode and was started on empiric IV antibiotics.  The antibiotics were discontinued after 48 hours of remaining afebrile and no leukocytosis.  On postoperative day 8, he was tolerating a diet, ambulatory, and afebrile.  He had good urine output and had regained bowel function.  His abdominal exam was appropriate and this incision was healing well without signs of infection.  His pain was controlled with oral medications.  Case management had arranged for home health.  Therefore he was deemed stable for discharge home.      Goals of Care Treatment Preferences:  Code Status: Full Code      Consults:   Consults (From admission, onward)        Status Ordering Provider     Inpatient consult to Social Work/Case Management  Once        Provider:  (Not yet assigned)    Acknowledged NURY KNOX          Significant Diagnostic Studies:   Specimen (24h ago, onward)    None          Pending Diagnostic Studies:     None        Final Active Diagnoses:    Diagnosis Date Noted POA    PRINCIPAL PROBLEM:  Malignant neoplasm of rectum [C20] 12/27/2022 Yes      Problems Resolved During this Admission:      Discharged Condition: stable    [place] [facility] Up:   Follow-up Information     [facility] Follow up.    Specialties: Home Health Services, Home Therapy Services, Home Living Aide Services  Why: This is your home health agency.  Contact information:  [address] [place] [address]  [phone]           Nury Knox MD Follow up in 2 week(s).    Specialty: Colon and Rectal Surgery  Why: For suture removal  Contact information:  [address]   [address]  [facility] [address]  [phone]                       Patient Instructions:      Diet Adult Regular     Lifting restrictions   Order Comments: No lifting >10lbs for 6 weeks     EKG 12-lead   Standing Status: Future Number of Occurrences: 1 Standing Exp. Date: 12/22/23      Medications:  Reconciled Home Medications:      Medication List      START taking these medications    alfuzosin 10 mg Tb24  Commonly known as: UROXATRAL  Take 1 tablet (10 mg total) by mouth daily with breakfast.     amoxicillin-clavulanate 875-125mg 875-125 mg per tablet  Commonly known as: AUGMENTIN  Take 1 tablet by mouth every 12 (twelve) hours. for 7 days        CHANGE how you take these medications    HYDROmorphone 2 MG tablet  Commonly known as: DILAUDID  Take 1 tablet (2 mg total) by mouth every 4 (four) hours as needed (pain).  What changed:   · medication strength  · how much to take  · reasons to take this        CONTINUE taking these medications    amitriptyline 75 MG tablet  Commonly known as: ELAVIL  Take 75 mg by mouth every evening.     gabapentin 600 MG tablet  Commonly known as: NEURONTIN  Take 600 mg by mouth 3 (three) times daily.     polyethylene glycol 17 gram/dose powder  Commonly known as: GLYCOLAX  Take 17 g by mouth daily as needed.     tiZANidine 4 MG tablet  Commonly known as: ZANAFLEX  Take 4 mg by mouth every 8 (eight) hours as needed.        STOP taking these medications    SENNA-C ORAL          Time spent on the discharge of patient: 35 minutes    Bob Rahman MD  General Surgery  [facility] - 8th Floor Med Surg

## 2023-01-04 NOTE — NURSING
"   01/04/23 1000        Colostomy 12/27/22 1208 LUQ   Placement Date/Time: 12/27/22 1208   Present Prior to Hospital Arrival?: No  Inserted by: MD  Location: LUQ   Stomal Appliance 2 piece;Intact;No Leakage   Stoma Appearance round;swollen;red   Stoma Size (in) 1 5/8 x 1 5/8"   Peristomal Assessment Intact   Stoma Function flatus;stool   Treatment   (Bag burped)   Output (mL) 200 mL     Pt being discharged today.   Awaiting nurse with papers.  We have had 2 sessions with instructions with pt.   Wife was encouraged but did not participate.     He understands what he needs to do.   Home health will be following.    "

## 2023-01-04 NOTE — PROGRESS NOTES
CRS    POD 8 s/p APR    Doing fine  Didn't sleep much last night  Tolerating diet    Afebrile   VSS    Abdomen soft, NT  Colostomy pink and working      Plan - d/c home

## 2023-01-04 NOTE — HOSPITAL COURSE
Patient is a 52-year-old black male who had a massive lower GI bleed resulting in syncope in April for which he was brought to the emergency room.  Colonoscopy revealed a large rectal polyp so he was referred to Dr. Fan Juarez.  He underwent flexible sigmoidoscopy 05/05/2022 at which time he was diagnosed with a biopsy-proven rectal adenocarcinoma just above the dentate line.  Staging MRI showed a T3 N2 lesion so he underwent neoadjuvant radiation which was completed sometime in October.  The patient was reluctant to have surgery because he did not want a permanent colostomy bag.  He continued to have pain and has been on Dilaudid 8 mg orally.  He was finally amenable to surgery and was admitted through day surgery on 12/27/2022.  He underwent an abdominoperineal resection without complication.  Please see operative note for full details.  Postoperatively he was admitted to the general surgery floor.  Clear liquids diet was initiated and slowly advanced as tolerated.  Lovenox was administered daily.  Enterostomal therapist was consulted for colostomy care and education.  Hurst catheter remained in place until postoperative day 4 due to the deep pelvic dissection, and e was able to void after it was removed.  The following night he had febrile episode and was started on empiric IV antibiotics.  The antibiotics were discontinued after 48 hours of remaining afebrile and no leukocytosis.  On postoperative day 8, he was tolerating a diet, ambulatory, and afebrile.  He had good urine output and had regained bowel function.  His abdominal exam was appropriate and this incision was healing well without signs of infection.  His pain was controlled with oral medications.  Case management had arranged for home health.  Therefore he was deemed stable for discharge home.

## 2023-01-06 ENCOUNTER — PATIENT OUTREACH (OUTPATIENT)
Dept: ADMINISTRATIVE | Facility: CLINIC | Age: 53
End: 2023-01-06

## 2023-01-06 NOTE — PROGRESS NOTES
C3 nurse attempted to contact Demetrio Wilson Jr. for a TCC post hospital discharge follow up call. No answer. Left voicemail with callback information. The patient has a scheduled HOSFU appointment with Bob Rahman MD ( Colon and Rectal Surgery)  1/11/23  @ 11am.

## 2023-02-09 ENCOUNTER — OFFICE VISIT (OUTPATIENT)
Dept: SURGICAL ONCOLOGY | Facility: CLINIC | Age: 53
End: 2023-02-09
Payer: COMMERCIAL

## 2023-02-09 VITALS
HEIGHT: 68 IN | SYSTOLIC BLOOD PRESSURE: 117 MMHG | DIASTOLIC BLOOD PRESSURE: 67 MMHG | BODY MASS INDEX: 27.61 KG/M2 | WEIGHT: 182.19 LBS | HEART RATE: 94 BPM

## 2023-02-09 DIAGNOSIS — C20 MALIGNANT NEOPLASM OF RECTUM: Primary | ICD-10-CM

## 2023-02-09 PROCEDURE — 99024 POSTOP FOLLOW-UP VISIT: CPT | Mod: ,,, | Performed by: COLON & RECTAL SURGERY

## 2023-02-09 PROCEDURE — 99999 PR PBB SHADOW E&M-EST. PATIENT-LVL III: CPT | Mod: PBBFAC,,, | Performed by: COLON & RECTAL SURGERY

## 2023-02-09 PROCEDURE — 99999 PR PBB SHADOW E&M-EST. PATIENT-LVL III: ICD-10-PCS | Mod: PBBFAC,,, | Performed by: COLON & RECTAL SURGERY

## 2023-02-09 PROCEDURE — 99024 PR POST-OP FOLLOW-UP VISIT: ICD-10-PCS | Mod: ,,, | Performed by: COLON & RECTAL SURGERY

## 2023-02-09 RX ORDER — AMOXICILLIN AND CLAVULANATE POTASSIUM 875; 125 MG/1; MG/1
1 TABLET, FILM COATED ORAL EVERY 12 HOURS
Qty: 20 TABLET | Refills: 0 | Status: SHIPPED | OUTPATIENT
Start: 2023-02-09 | End: 2023-02-19

## 2023-02-09 NOTE — PROGRESS NOTES
"   Patient ID: [id number]     Chief Complaint: Follow-up      HPI:     Demetrio Wilson Jr. is a 52 y.o. male here today for a post op visit. Denies fever, nausea, vomiting, abdominal pain, diarrhea, constipation, decreased appetite. Complaining of wound drainage from his perineal wound.  Overall he is feeling better.  He is no longer having issues with urination.  He wants to know when he can start driving.  He says that he saw his oncologist 1 week after being discharged from the hospital.    Current Outpatient Medications   Medication Instructions    alfuzosin (UROXATRAL) 10 mg, Oral, With breakfast    amitriptyline (ELAVIL) 75 mg, Oral, Nightly    amoxicillin-clavulanate 875-125mg (AUGMENTIN) 875-125 mg per tablet 1 tablet, Oral, Every 12 hours    gabapentin (NEURONTIN) 600 mg, Oral, 3 times daily    HYDROmorphone (DILAUDID) 2 mg, Oral, Every 4 hours PRN    polyethylene glycol (GLYCOLAX) 17 g, Oral, Daily PRN    tiZANidine (ZANAFLEX) 4 mg, Oral, Every 8 hours PRN       Patient has No Known Allergies.     Patient Care Team:  Primary Doctor No as PCP - General       Objective:     Visit Vitals  /67   Pulse 94   Ht 5' 8" (1.727 m)   Wt 82.6 kg (182 lb 3.2 oz)   BMI 27.70 kg/m²       Physical Exam    General: Alert and oriented, No acute distress.  Head: Normocephalic, Atraumatic.  Eye: Pupils are equal, round, Sclera non-icteric.  Respiratory: Non-labored respirations, Symmetrical chest wall expansion.  Gastrointestinal: Soft, Non-distended. Incision healed. LLQ colostomy pink and working.  Perineum: Healed except for small opening posterior aspect with mild drainage.  Extremities: No lower extremity edema.  Integumentary: Warm, Dry, Intact, No suspicious lesions or rashes.  Neurologic: No focal deficits.    Assessment:       ICD-10-CM ICD-9-CM   1. Malignant neoplasm of rectum  C20 154.1        Plan:     1. Malignant neoplasm of rectum  - Augmentin 875 mg PO q12h x 10 days  - Ok to drive  - No " activity restrictions      Follow up in about 1 month (around 3/9/2023). In addition to their scheduled follow up, the patient has also been instructed to follow up on as needed basis.     Future Appointments   Date Time Provider Department Center   3/9/2023  1:30 PM Bob Rahman MD [facility]        Bob Rahman MD

## 2023-03-09 ENCOUNTER — OFFICE VISIT (OUTPATIENT)
Dept: SURGICAL ONCOLOGY | Facility: CLINIC | Age: 53
End: 2023-03-09
Payer: COMMERCIAL

## 2023-03-09 VITALS
WEIGHT: 187.63 LBS | HEIGHT: 68 IN | HEART RATE: 87 BPM | SYSTOLIC BLOOD PRESSURE: 142 MMHG | BODY MASS INDEX: 28.44 KG/M2 | DIASTOLIC BLOOD PRESSURE: 84 MMHG

## 2023-03-09 DIAGNOSIS — C20 MALIGNANT NEOPLASM OF RECTUM: Primary | ICD-10-CM

## 2023-03-09 PROCEDURE — 1160F RVW MEDS BY RX/DR IN RCRD: CPT | Mod: CPTII,S$GLB,, | Performed by: COLON & RECTAL SURGERY

## 2023-03-09 PROCEDURE — 99999 PR PBB SHADOW E&M-EST. PATIENT-LVL III: ICD-10-PCS | Mod: PBBFAC,,, | Performed by: COLON & RECTAL SURGERY

## 2023-03-09 PROCEDURE — 3077F PR MOST RECENT SYSTOLIC BLOOD PRESSURE >= 140 MM HG: ICD-10-PCS | Mod: CPTII,S$GLB,, | Performed by: COLON & RECTAL SURGERY

## 2023-03-09 PROCEDURE — 99024 POSTOP FOLLOW-UP VISIT: CPT | Mod: S$GLB,,, | Performed by: COLON & RECTAL SURGERY

## 2023-03-09 PROCEDURE — 3077F SYST BP >= 140 MM HG: CPT | Mod: CPTII,S$GLB,, | Performed by: COLON & RECTAL SURGERY

## 2023-03-09 PROCEDURE — 1159F MED LIST DOCD IN RCRD: CPT | Mod: CPTII,S$GLB,, | Performed by: COLON & RECTAL SURGERY

## 2023-03-09 PROCEDURE — 3079F PR MOST RECENT DIASTOLIC BLOOD PRESSURE 80-89 MM HG: ICD-10-PCS | Mod: CPTII,S$GLB,, | Performed by: COLON & RECTAL SURGERY

## 2023-03-09 PROCEDURE — 1159F PR MEDICATION LIST DOCUMENTED IN MEDICAL RECORD: ICD-10-PCS | Mod: CPTII,S$GLB,, | Performed by: COLON & RECTAL SURGERY

## 2023-03-09 PROCEDURE — 3008F PR BODY MASS INDEX (BMI) DOCUMENTED: ICD-10-PCS | Mod: CPTII,S$GLB,, | Performed by: COLON & RECTAL SURGERY

## 2023-03-09 PROCEDURE — 1160F PR REVIEW ALL MEDS BY PRESCRIBER/CLIN PHARMACIST DOCUMENTED: ICD-10-PCS | Mod: CPTII,S$GLB,, | Performed by: COLON & RECTAL SURGERY

## 2023-03-09 PROCEDURE — 3008F BODY MASS INDEX DOCD: CPT | Mod: CPTII,S$GLB,, | Performed by: COLON & RECTAL SURGERY

## 2023-03-09 PROCEDURE — 3079F DIAST BP 80-89 MM HG: CPT | Mod: CPTII,S$GLB,, | Performed by: COLON & RECTAL SURGERY

## 2023-03-09 PROCEDURE — 99999 PR PBB SHADOW E&M-EST. PATIENT-LVL III: CPT | Mod: PBBFAC,,, | Performed by: COLON & RECTAL SURGERY

## 2023-03-09 PROCEDURE — 99024 PR POST-OP FOLLOW-UP VISIT: ICD-10-PCS | Mod: S$GLB,,, | Performed by: COLON & RECTAL SURGERY

## 2023-03-10 NOTE — PROGRESS NOTES
"   Patient ID: [id number]     HPI:     Demetrio Wilson Jr. is a 52 y.o. male here today for a post op visit. Denies fever, nausea, vomiting, abdominal pain, diarrhea, constipation, decreased appetite. Complaining of wound drainage.  He complains of urinary incontinence and bilateral lower extremity weakness for the 1st time since surgery.  Following surgery he had complaints of urinary hesitance and was prescribed Flomax.  He apparently underwent MRI of the cervical/thoracic/lumbar spine that showed chronic findings.  MRI of the brain was negative for metastatic disease.  CT scan chest/abdomen/pelvis reports were reviewed and shows new development of multiple pulmonary nodules, 2 left lobe liver lesions concerning for metastases, and thickening of the pelvic soft tissues possibly representing recurrence.  No pelvic fluid collection was described.  His wife says that the patient is unable to work at this time due to all of his medical and surgical issues.  She says they have already lost their home.    Current Outpatient Medications   Medication Instructions    alfuzosin (UROXATRAL) 10 mg, Oral, With breakfast    amitriptyline (ELAVIL) 75 mg, Oral, Nightly    gabapentin (NEURONTIN) 600 mg, Oral, 3 times daily    HYDROmorphone (DILAUDID) 2 mg, Oral, Every 4 hours PRN    polyethylene glycol (GLYCOLAX) 17 g, Oral, Daily PRN    tiZANidine (ZANAFLEX) 4 mg, Oral, Every 8 hours PRN       Patient has No Known Allergies.     Patient Care Team:  Primary Doctor No as PCP - General       Objective:     Visit Vitals  BP (!) 142/84   Pulse 87   Ht 5' 8" (1.727 m)   Wt 85.1 kg (187 lb 9.6 oz)   BMI 28.52 kg/m²       Physical Exam    General: Alert and oriented, No acute distress.  Head: Normocephalic, Atraumatic.  Eye: Pupils are equal, round, Sclera non-icteric.  Respiratory: Non-labored respirations, Symmetrical chest wall expansion.  Gastrointestinal: Soft, Non-distended. Incisions healed.  Left lower quadrant colostomy " pink and working.  Perineum:  Very small opening at the posterior aspect of the incision near the tailbone with milky looking fluid drainage without odor.  Extremities: No lower extremity edema.  Integumentary: Warm, Dry, Intact.  Neurologic: No focal deficits.      Assessment:       ICD-10-CM ICD-9-CM   1. Malignant neoplasm of rectum  C20 154.1        Plan:     1. Malignant neoplasm of rectum  Unfortunately based on these recent CT scans, it appears he is developed pulmonary and hepatic metastases and it is unlikely he will be able to continue to work.  Based on my assessment it is okay for chemotherapy.  Return to clinic in 2 weeks to evaluate his perineal wound.       No follow-ups on file. In addition to their scheduled follow up, the patient has also been instructed to follow up on as needed basis.     Future Appointments   Date Time Provider Department Center   3/30/2023  1:30 PM [facility] [address]        Bob Rahman MD

## 2023-03-30 ENCOUNTER — OFFICE VISIT (OUTPATIENT)
Dept: SURGICAL ONCOLOGY | Facility: CLINIC | Age: 53
End: 2023-03-30
Payer: COMMERCIAL

## 2023-03-30 VITALS
SYSTOLIC BLOOD PRESSURE: 118 MMHG | BODY MASS INDEX: 26.47 KG/M2 | HEIGHT: 68 IN | DIASTOLIC BLOOD PRESSURE: 76 MMHG | HEART RATE: 109 BPM | WEIGHT: 174.63 LBS

## 2023-03-30 DIAGNOSIS — C20 MALIGNANT NEOPLASM OF RECTUM: Primary | ICD-10-CM

## 2023-03-30 PROCEDURE — 99999 PR PBB SHADOW E&M-EST. PATIENT-LVL III: ICD-10-PCS | Mod: PBBFAC,,, | Performed by: COLON & RECTAL SURGERY

## 2023-03-30 PROCEDURE — 3078F PR MOST RECENT DIASTOLIC BLOOD PRESSURE < 80 MM HG: ICD-10-PCS | Mod: CPTII,S$GLB,, | Performed by: COLON & RECTAL SURGERY

## 2023-03-30 PROCEDURE — 99999 PR PBB SHADOW E&M-EST. PATIENT-LVL III: CPT | Mod: PBBFAC,,, | Performed by: COLON & RECTAL SURGERY

## 2023-03-30 PROCEDURE — 3008F PR BODY MASS INDEX (BMI) DOCUMENTED: ICD-10-PCS | Mod: CPTII,S$GLB,, | Performed by: COLON & RECTAL SURGERY

## 2023-03-30 PROCEDURE — 3074F PR MOST RECENT SYSTOLIC BLOOD PRESSURE < 130 MM HG: ICD-10-PCS | Mod: CPTII,S$GLB,, | Performed by: COLON & RECTAL SURGERY

## 2023-03-30 PROCEDURE — 3074F SYST BP LT 130 MM HG: CPT | Mod: CPTII,S$GLB,, | Performed by: COLON & RECTAL SURGERY

## 2023-03-30 PROCEDURE — 3008F BODY MASS INDEX DOCD: CPT | Mod: CPTII,S$GLB,, | Performed by: COLON & RECTAL SURGERY

## 2023-03-30 PROCEDURE — 3078F DIAST BP <80 MM HG: CPT | Mod: CPTII,S$GLB,, | Performed by: COLON & RECTAL SURGERY

## 2023-03-30 PROCEDURE — 99214 PR OFFICE/OUTPT VISIT, EST, LEVL IV, 30-39 MIN: ICD-10-PCS | Mod: S$GLB,,, | Performed by: COLON & RECTAL SURGERY

## 2023-03-30 PROCEDURE — 1160F RVW MEDS BY RX/DR IN RCRD: CPT | Mod: CPTII,S$GLB,, | Performed by: COLON & RECTAL SURGERY

## 2023-03-30 PROCEDURE — 1159F PR MEDICATION LIST DOCUMENTED IN MEDICAL RECORD: ICD-10-PCS | Mod: CPTII,S$GLB,, | Performed by: COLON & RECTAL SURGERY

## 2023-03-30 PROCEDURE — 1159F MED LIST DOCD IN RCRD: CPT | Mod: CPTII,S$GLB,, | Performed by: COLON & RECTAL SURGERY

## 2023-03-30 PROCEDURE — 1160F PR REVIEW ALL MEDS BY PRESCRIBER/CLIN PHARMACIST DOCUMENTED: ICD-10-PCS | Mod: CPTII,S$GLB,, | Performed by: COLON & RECTAL SURGERY

## 2023-03-30 PROCEDURE — 99214 OFFICE O/P EST MOD 30 MIN: CPT | Mod: S$GLB,,, | Performed by: COLON & RECTAL SURGERY

## 2023-03-30 NOTE — PROGRESS NOTES
"   Patient ID: [id number]     Chief Complaint: Follow-up      HPI:     Demetrio Wilson Jr. is a 52 y.o. male here today for scheduled follow-up.  He seems to be in better spirits.  He says if he could only get his perineal wound to heal, he'd be doing great.  He had an ultrasound of his liver in preparation for a percutaneous biopsy.  He reports decreased drainage from his wound and that there was no longer an odor.  His colostomy is working well.    Current Outpatient Medications   Medication Instructions    alfuzosin (UROXATRAL) 10 mg, Oral, With breakfast    amitriptyline (ELAVIL) 75 mg, Oral, Nightly    gabapentin (NEURONTIN) 600 mg, Oral, 3 times daily    HYDROmorphone (DILAUDID) 2 mg, Oral, Every 4 hours PRN    polyethylene glycol (GLYCOLAX) 17 g, Oral, Daily PRN    tiZANidine (ZANAFLEX) 4 mg, Oral, Every 8 hours PRN       Patient has No Known Allergies.     Patient Care Team:  Primary Doctor No as PCP - General       Subjective:     Review of Systems    12 point review of systems conducted, negative except as stated in the history of present illness. See HPI for details.      Objective:     Visit Vitals  /76   Pulse 109   Ht 5' 8" (1.727 m)   Wt 79.2 kg (174 lb 9.6 oz)   BMI 26.55 kg/m²       Physical Exam    General: Alert and oriented, No acute distress.  Head: Normocephalic, Atraumatic.  Eye: Sclera non-icteric.  Respiratory: Non-labored respirations, Symmetrical chest wall expansion.  Cardiovascular: Regular rate.  Gastrointestinal: Soft, Non-tender, Non-distended, Normal bowel sounds, LLQ end-colostomy.  Perineum:  Very small posterior opening that could barely be probed with a silver nitrate stick.  Integumentary: Warm, Dry, Intact, No rashes.  Extremities: No edema.  Neurologic: No focal deficits.      Labs Reviewed:     Chemistry:  Lab Results   Component Value Date     01/03/2023    K 3.9 01/03/2023    CHLORIDE 105 01/03/2023    BUN 9.1 01/03/2023    CREATININE 0.80 01/03/2023    " EGFRNORACEVR >90 01/03/2023    GLUCOSE 92 01/03/2023    CALCIUM 9.0 01/03/2023    ALKPHOS 58 01/03/2023    LABPROT 7.4 01/03/2023    ALBUMIN 3.1 (L) 01/03/2023    AST 16 01/03/2023    ALT 9 01/03/2023        Hematology:  Lab Results   Component Value Date    WBC 5.1 01/03/2023    HGB 8.5 (L) 01/03/2023    HCT 26.5 (L) 01/03/2023     01/03/2023         Assessment:       ICD-10-CM ICD-9-CM   1. Malignant neoplasm of rectum  C20 154.1        Plan:     1. Malignant neoplasm of rectum  I offered to see the patient back in 3 weeks to monitor the healing of his perineal wound, but he declined and said he would call me if needed.        No follow-ups on file. In addition to their scheduled follow up, the patient has also been instructed to follow up on as needed basis.     No future appointments.     Bob Rahman MD

## 2023-04-19 ENCOUNTER — HOSPITAL ENCOUNTER (OUTPATIENT)
Dept: RADIOLOGY | Facility: HOSPITAL | Age: 53
Discharge: HOME | End: 2023-04-19
Attending: NURSE PRACTITIONER
Payer: COMMERCIAL

## 2023-04-19 VITALS
OXYGEN SATURATION: 94 % | TEMPERATURE: 98 F | SYSTOLIC BLOOD PRESSURE: 134 MMHG | BODY MASS INDEX: 26.36 KG/M2 | RESPIRATION RATE: 18 BRPM | WEIGHT: 173.94 LBS | HEART RATE: 80 BPM | HEIGHT: 68 IN | DIASTOLIC BLOOD PRESSURE: 84 MMHG

## 2023-04-19 DIAGNOSIS — K76.9 LIVER LESION: ICD-10-CM

## 2023-04-19 LAB
BASOPHILS # BLD AUTO: 0.02 X10(3)/MCL (ref 0–0.2)
BASOPHILS NFR BLD AUTO: 0.6 %
EOSINOPHIL # BLD AUTO: 0.06 X10(3)/MCL (ref 0–0.9)
EOSINOPHIL NFR BLD AUTO: 1.8 %
ERYTHROCYTE [DISTWIDTH] IN BLOOD BY AUTOMATED COUNT: 18.5 % (ref 11.5–17)
HCT VFR BLD AUTO: 32.7 % (ref 42–52)
HGB BLD-MCNC: 9.9 G/DL (ref 14–18)
IMM GRANULOCYTES # BLD AUTO: 0.01 X10(3)/MCL (ref 0–0.04)
IMM GRANULOCYTES NFR BLD AUTO: 0.3 %
INR BLD: 0.99 (ref 0–1.3)
LYMPHOCYTES # BLD AUTO: 0.93 X10(3)/MCL (ref 0.6–4.6)
LYMPHOCYTES NFR BLD AUTO: 28.4 %
MCH RBC QN AUTO: 23.5 PG (ref 27–31)
MCHC RBC AUTO-ENTMCNC: 30.3 G/DL (ref 33–36)
MCV RBC AUTO: 77.7 FL (ref 80–94)
MONOCYTES # BLD AUTO: 0.46 X10(3)/MCL (ref 0.1–1.3)
MONOCYTES NFR BLD AUTO: 14.1 %
NEUTROPHILS # BLD AUTO: 1.79 X10(3)/MCL (ref 2.1–9.2)
NEUTROPHILS NFR BLD AUTO: 54.8 %
NRBC BLD AUTO-RTO: 0 %
PLATELET # BLD AUTO: 367 X10(3)/MCL (ref 130–400)
PMV BLD AUTO: 8.5 FL (ref 7.4–10.4)
PROTHROMBIN TIME: 13 SECONDS (ref 12.5–14.5)
RBC # BLD AUTO: 4.21 X10(6)/MCL (ref 4.7–6.1)
WBC # SPEC AUTO: 3.3 X10(3)/MCL (ref 4.5–11.5)

## 2023-04-19 PROCEDURE — 85610 PROTHROMBIN TIME: CPT | Performed by: RADIOLOGY

## 2023-04-19 PROCEDURE — 63600175 PHARM REV CODE 636 W HCPCS: Performed by: RADIOLOGY

## 2023-04-19 PROCEDURE — 85025 COMPLETE CBC W/AUTO DIFF WBC: CPT | Performed by: RADIOLOGY

## 2023-04-19 PROCEDURE — 47000 NEEDLE BIOPSY OF LIVER PERQ: CPT

## 2023-04-19 PROCEDURE — 25000003 PHARM REV CODE 250: Performed by: RADIOLOGY

## 2023-04-19 RX ORDER — MIDAZOLAM HYDROCHLORIDE 1 MG/ML
INJECTION INTRAMUSCULAR; INTRAVENOUS
Status: COMPLETED | OUTPATIENT
Start: 2023-04-19 | End: 2023-04-19

## 2023-04-19 RX ORDER — FENTANYL CITRATE 50 UG/ML
INJECTION, SOLUTION INTRAMUSCULAR; INTRAVENOUS
Status: COMPLETED | OUTPATIENT
Start: 2023-04-19 | End: 2023-04-19

## 2023-04-19 RX ORDER — MIDAZOLAM HYDROCHLORIDE 1 MG/ML
INJECTION INTRAMUSCULAR; INTRAVENOUS
Status: DISPENSED
Start: 2023-04-19 | End: 2023-04-19

## 2023-04-19 RX ORDER — FENTANYL CITRATE 50 UG/ML
INJECTION, SOLUTION INTRAMUSCULAR; INTRAVENOUS
Status: DISPENSED
Start: 2023-04-19 | End: 2023-04-19

## 2023-04-19 RX ORDER — LIDOCAINE HYDROCHLORIDE 20 MG/ML
INJECTION, SOLUTION EPIDURAL; INFILTRATION; INTRACAUDAL; PERINEURAL
Status: DISPENSED
Start: 2023-04-19 | End: 2023-04-19

## 2023-04-19 RX ORDER — LIDOCAINE HYDROCHLORIDE 20 MG/ML
INJECTION, SOLUTION INFILTRATION; PERINEURAL
Status: COMPLETED | OUTPATIENT
Start: 2023-04-19 | End: 2023-04-19

## 2023-04-19 RX ADMIN — FENTANYL CITRATE 25 MCG: 50 INJECTION, SOLUTION INTRAMUSCULAR; INTRAVENOUS at 08:04

## 2023-04-19 RX ADMIN — MIDAZOLAM 0.5 MG: 1 INJECTION INTRAMUSCULAR; INTRAVENOUS at 08:04

## 2023-04-19 RX ADMIN — LIDOCAINE HYDROCHLORIDE 5 ML: 20 INJECTION, SOLUTION INFILTRATION; PERINEURAL at 08:04

## 2023-04-19 NOTE — H&P
Radiology History & Physical      SUBJECTIVE:     Chief Complaint: Liver Lesion    History of Present Illness:  Demetrio Wilson Jr. is a 52 y.o. male who presents for Biopsy  Past Medical History:   Diagnosis Date    Constipation     GERD (gastroesophageal reflux disease)     Insomnia     Leg pain     bilateral    Rectal cancer     Rectal pain     Swelling of lower leg      Past Surgical History:   Procedure Laterality Date    ABDOMINOPERINEAL RESECTION OF RECTUM N/A 12/27/2022    Procedure: PROCTECTOMY, ABDOMINOPERINEAL;  Surgeon: Bob Rahman MD;  Location: [facility] OR;  Service: Colon and Rectal;  Laterality: N/A;  ABDOMINOPERINEAL RESECTION    COLONOSCOPY      c biopsy       Home Meds:   Prior to Admission medications    Medication Sig Start Date End Date Taking? Authorizing Provider   alfuzosin (UROXATRAL) 10 mg Tb24 Take 1 tablet (10 mg total) by mouth daily with breakfast. 1/4/23  Yes Bob Rahman MD   amitriptyline (ELAVIL) 75 MG tablet Take 75 mg by mouth every evening.   Yes Historical Provider   gabapentin (NEURONTIN) 600 MG tablet Take 600 mg by mouth 3 (three) times daily.   Yes Historical Provider   HYDROmorphone (DILAUDID) 2 MG tablet Take 1 tablet (2 mg total) by mouth every 4 (four) hours as needed (pain). 1/4/23  Yes Bob Rahman MD   tiZANidine (ZANAFLEX) 4 MG tablet Take 4 mg by mouth every 8 (eight) hours as needed.   Yes Historical Provider   polyethylene glycol (GLYCOLAX) 17 gram/dose powder Take 17 g by mouth daily as needed.    Historical Provider     Anticoagulants/Antiplatelets: no anticoagulation    Allergies: Review of patient's allergies indicates:  No Known Allergies  Sedation History:  no adverse reactions    Review of Systems:   Hematological: no known coagulopathies  Respiratory: no shortness of breath  Cardiovascular: no chest pain  Gastrointestinal: no abdominal pain  Genito-Urinary: no dysuria  Musculoskeletal: negative  Neurological: no TIA or stroke  symptoms         OBJECTIVE:     Vital Signs (Most Recent)  Temp: 97.6 °F (36.4 °C) (04/19/23 0709)  Pulse: 84 (04/19/23 0931)  Resp: 18 (04/19/23 0845)  BP: 126/84 (04/19/23 0931)  SpO2: 96 % (04/19/23 0931)    Physical Exam:  ASA: 2    General: no acute distress  Mental Status: alert and oriented to person, place and time  HEENT: normocephalic, atraumatic  Chest: unlabored breathing  Heart: regular heart rate  Abdomen: nondistended  Extremity: moves all extremities    Laboratory  Lab Results   Component Value Date    INR 0.99 04/19/2023       Lab Results   Component Value Date    WBC 3.3 (L) 04/19/2023    HGB 9.9 (L) 04/19/2023    HCT 32.7 (L) 04/19/2023    MCV 77.7 (L) 04/19/2023     04/19/2023      Lab Results   Component Value Date     01/03/2023    K 3.9 01/03/2023    CO2 22 01/03/2023    BUN 9.1 01/03/2023    CREATININE 0.80 01/03/2023    CALCIUM 9.0 01/03/2023    ALT 9 01/03/2023    AST 16 01/03/2023    ALBUMIN 3.1 (L) 01/03/2023    BILITOT 0.4 01/03/2023       ASSESSMENT/PLAN:     Sedation Plan: Moderate   Patient will undergo Liver Biopsy.    Itz Lott MD

## 2023-04-19 NOTE — DISCHARGE SUMMARY
Radiology Post-Procedure Note    Pre Op Diagnosis: left liver mass    Post Op Diagnosis: left liver mass    Procedure: left liver biopsy    Procedure performed by: Oren    Written Informed Consent Obtained: Yes    Specimen Removed: YES    Estimated Blood Loss: Minimal    Findings:   Standard US Guided Biopsy    @SIG@

## 2023-04-20 LAB — PSYCHE PATHOLOGY RESULT: NORMAL

## 2023-07-12 ENCOUNTER — DOCUMENTATION ONLY (OUTPATIENT)
Dept: SURGICAL ONCOLOGY | Facility: CLINIC | Age: 53
End: 2023-07-12
Payer: COMMERCIAL

## 2023-07-12 NOTE — PROGRESS NOTES
PATIENT WIFE HAS CALLED COMPLAINING OF BLEEDING FROM INCISION SITE SURGERY WAS 12/27/2022. CALLED WIFE BACK WITH NO ANSWER UNABLE TO LEAVE VOICE MESSAGE WILL TALK TO HER IS HSE CALLS AGAIN.

## (undated) DEVICE — COVER TABLE HVY DTY 60X90IN

## (undated) DEVICE — TIP SUCTION YANKAUER

## (undated) DEVICE — DRAPE TOP 53X102IN

## (undated) DEVICE — RETRACTOR LONE STAR 14.1X14.1

## (undated) DEVICE — TRAY CATH FOL SIL URIMTR 16FR

## (undated) DEVICE — BOWL STERILE LARGE 32OZ

## (undated) DEVICE — SYR IRRIGATION BULB STER 60ML

## (undated) DEVICE — STAPLER SKIN PROXIMATE WIDE

## (undated) DEVICE — CUTTER PROXIMATE BLUE 75MM

## (undated) DEVICE — SUT CTD VICRYL BR CR/SH VIL

## (undated) DEVICE — PAD PREP 50/CA

## (undated) DEVICE — HOOK LONE STAR SHRP ELAS 5MM

## (undated) DEVICE — DRESSING ANTIMIC ISLAND 4X10IN

## (undated) DEVICE — JELLY SURGILUBE LUBE TUBE 2OZ

## (undated) DEVICE — DRESSING ANTIMICROBIAL 1 INCH

## (undated) DEVICE — GLOVE PROTEXIS LTX MICRO  7.5

## (undated) DEVICE — LEGGING SURG CONV 43X28IN

## (undated) DEVICE — TRAY SKIN SCRUB WET PREMIUM

## (undated) DEVICE — TAPE SILK 3IN

## (undated) DEVICE — DEVICE ENSEAL X1 LARGE JAW

## (undated) DEVICE — GLOVE PROTEXIS BLUE LATEX 8

## (undated) DEVICE — TOWEL OR WHT XRAY 16X26 2/PK

## (undated) DEVICE — SUT VICRYL PLUS 2-0 SH 27IN

## (undated) DEVICE — DRAPE UNDER BUTTOCKS SUC PORT

## (undated) DEVICE — SEE MEDLINE ITEM 146292

## (undated) DEVICE — PAD PINK TRENDELENBURG POS XL

## (undated) DEVICE — ELECTRODE PATIENT RETURN DISP

## (undated) DEVICE — TUBE SUCTION MEDI-VAC STERILE

## (undated) DEVICE — PENCIL ELECSURG ROCKER 15FT

## (undated) DEVICE — DRAPE STERI INSTRUMENT 1018

## (undated) DEVICE — SOL NACL IRR 1000ML BTL

## (undated) DEVICE — DRAPE FLUID WARMER 44X44IN

## (undated) DEVICE — CONTAINER SPECIMEN SCREW 4OZ

## (undated) DEVICE — APPLICATOR CHLORAPREP ORN 26ML

## (undated) DEVICE — CUSHION  WC FOAM 20X20X.75IN

## (undated) DEVICE — SUT SILK 2-0 LIGAPAK BLK BR

## (undated) DEVICE — SUT VICRYL PLUS 0 CT-1 18IN

## (undated) DEVICE — RESERVOIR JACKSON-PRATT 100CC

## (undated) DEVICE — ELECTRODE BLD EXT 6.50 ST DISP

## (undated) DEVICE — KIT SURGICAL TURNOVER

## (undated) DEVICE — SPONGE LAP STRL 18X18IN

## (undated) DEVICE — SOL BETADINE 5%

## (undated) DEVICE — SYR 10CC LUER LOCK

## (undated) DEVICE — DRAIN JACKSON PRATT TRCR 19FR

## (undated) DEVICE — PAD ABD 8X10 STERILE

## (undated) DEVICE — Device

## (undated) DEVICE — DRESSING XEROFORM 5X9IN

## (undated) DEVICE — GOWN SMARTSLEEVE AAMI LVL4 LG

## (undated) DEVICE — BULB BLADDER ASSEMBLY STRL